# Patient Record
Sex: MALE | Race: BLACK OR AFRICAN AMERICAN | NOT HISPANIC OR LATINO
[De-identification: names, ages, dates, MRNs, and addresses within clinical notes are randomized per-mention and may not be internally consistent; named-entity substitution may affect disease eponyms.]

---

## 2018-08-20 PROBLEM — Z00.00 ENCOUNTER FOR PREVENTIVE HEALTH EXAMINATION: Status: ACTIVE | Noted: 2018-08-20

## 2018-08-22 ENCOUNTER — APPOINTMENT (OUTPATIENT)
Dept: OTOLARYNGOLOGY | Facility: CLINIC | Age: 58
End: 2018-08-22
Payer: COMMERCIAL

## 2018-08-22 VITALS
HEART RATE: 71 BPM | SYSTOLIC BLOOD PRESSURE: 141 MMHG | OXYGEN SATURATION: 99 % | DIASTOLIC BLOOD PRESSURE: 99 MMHG | WEIGHT: 198 LBS | TEMPERATURE: 99.3 F | BODY MASS INDEX: 27.72 KG/M2 | HEIGHT: 71 IN

## 2018-08-22 PROCEDURE — 99203 OFFICE O/P NEW LOW 30 MIN: CPT

## 2018-08-29 ENCOUNTER — APPOINTMENT (OUTPATIENT)
Dept: OTOLARYNGOLOGY | Facility: CLINIC | Age: 58
End: 2018-08-29
Payer: COMMERCIAL

## 2018-08-29 VITALS
DIASTOLIC BLOOD PRESSURE: 95 MMHG | WEIGHT: 198 LBS | OXYGEN SATURATION: 99 % | HEART RATE: 93 BPM | TEMPERATURE: 98.4 F | HEIGHT: 71 IN | SYSTOLIC BLOOD PRESSURE: 137 MMHG | BODY MASS INDEX: 27.72 KG/M2

## 2018-08-29 DIAGNOSIS — Z87.19 PERSONAL HISTORY OF OTHER DISEASES OF THE DIGESTIVE SYSTEM: ICD-10-CM

## 2018-08-29 DIAGNOSIS — R22.0 LOCALIZED SWELLING, MASS AND LUMP, HEAD: ICD-10-CM

## 2018-08-29 DIAGNOSIS — Z78.9 OTHER SPECIFIED HEALTH STATUS: ICD-10-CM

## 2018-08-29 PROCEDURE — 31575 DIAGNOSTIC LARYNGOSCOPY: CPT

## 2018-08-29 PROCEDURE — 99213 OFFICE O/P EST LOW 20 MIN: CPT | Mod: 25

## 2018-08-31 VITALS
TEMPERATURE: 97 F | OXYGEN SATURATION: 98 % | WEIGHT: 187.83 LBS | RESPIRATION RATE: 16 BRPM | SYSTOLIC BLOOD PRESSURE: 149 MMHG | HEART RATE: 96 BPM | HEIGHT: 71 IN | DIASTOLIC BLOOD PRESSURE: 84 MMHG

## 2018-08-31 NOTE — ASU PATIENT PROFILE, ADULT - PMH
Gastritis    Hiatal hernia    Tonsillar mass Diverticulosis    Gastritis    Hiatal hernia    Tonsillar mass

## 2018-08-31 NOTE — ASU PATIENT PROFILE, ADULT - PSH
History of hip surgery  debridement 2008 Elective surgery  anal cyst removed  History of hip surgery  left, debridement 2008

## 2018-09-04 ENCOUNTER — OUTPATIENT (OUTPATIENT)
Dept: OUTPATIENT SERVICES | Facility: HOSPITAL | Age: 58
LOS: 1 days | Discharge: HOME | End: 2018-09-04
Payer: COMMERCIAL

## 2018-09-04 ENCOUNTER — RESULT REVIEW (OUTPATIENT)
Age: 58
End: 2018-09-04

## 2018-09-04 ENCOUNTER — APPOINTMENT (OUTPATIENT)
Dept: OTOLARYNGOLOGY | Facility: HOSPITAL | Age: 58
End: 2018-09-04

## 2018-09-04 VITALS
DIASTOLIC BLOOD PRESSURE: 82 MMHG | RESPIRATION RATE: 16 BRPM | OXYGEN SATURATION: 98 % | HEART RATE: 76 BPM | SYSTOLIC BLOOD PRESSURE: 132 MMHG

## 2018-09-04 DIAGNOSIS — Z98.890 OTHER SPECIFIED POSTPROCEDURAL STATES: Chronic | ICD-10-CM

## 2018-09-04 DIAGNOSIS — Z41.9 ENCOUNTER FOR PROCEDURE FOR PURPOSES OTHER THAN REMEDYING HEALTH STATE, UNSPECIFIED: Chronic | ICD-10-CM

## 2018-09-04 PROCEDURE — 88331 PATH CONSLTJ SURG 1 BLK 1SPC: CPT

## 2018-09-04 PROCEDURE — 88341 IMHCHEM/IMCYTCHM EA ADD ANTB: CPT

## 2018-09-04 PROCEDURE — 31536 LARYNGOSCOPY W/BX & OP SCOPE: CPT

## 2018-09-04 PROCEDURE — 42800 BIOPSY OF THROAT: CPT

## 2018-09-04 PROCEDURE — C9399: CPT

## 2018-09-04 PROCEDURE — 88305 TISSUE EXAM BY PATHOLOGIST: CPT

## 2018-09-04 RX ORDER — OXYCODONE AND ACETAMINOPHEN 5; 325 MG/1; MG/1
2 TABLET ORAL EVERY 6 HOURS
Qty: 0 | Refills: 0 | Status: DISCONTINUED | OUTPATIENT
Start: 2018-09-04 | End: 2018-09-04

## 2018-09-04 RX ORDER — ONDANSETRON 8 MG/1
4 TABLET, FILM COATED ORAL ONCE
Qty: 0 | Refills: 0 | Status: DISCONTINUED | OUTPATIENT
Start: 2018-09-04 | End: 2018-09-04

## 2018-09-04 RX ORDER — OXYCODONE AND ACETAMINOPHEN 5; 325 MG/1; MG/1
1 TABLET ORAL EVERY 4 HOURS
Qty: 0 | Refills: 0 | Status: DISCONTINUED | OUTPATIENT
Start: 2018-09-04 | End: 2018-09-04

## 2018-09-04 NOTE — BRIEF OPERATIVE NOTE - OPERATION/FINDINGS
endophytic right tonsillar mass with extension into the soft palate, biopsy taken down to muscle, frozen section positive for invasive squamous cell carcinoma

## 2018-09-05 PROBLEM — K57.90 DIVERTICULOSIS OF INTESTINE, PART UNSPECIFIED, WITHOUT PERFORATION OR ABSCESS WITHOUT BLEEDING: Chronic | Status: ACTIVE | Noted: 2018-08-31

## 2018-09-05 PROBLEM — K29.70 GASTRITIS, UNSPECIFIED, WITHOUT BLEEDING: Chronic | Status: ACTIVE | Noted: 2018-08-31

## 2018-09-05 PROBLEM — R22.0 LOCALIZED SWELLING, MASS AND LUMP, HEAD: Chronic | Status: ACTIVE | Noted: 2018-08-31

## 2018-09-05 PROBLEM — K44.9 DIAPHRAGMATIC HERNIA WITHOUT OBSTRUCTION OR GANGRENE: Chronic | Status: ACTIVE | Noted: 2018-08-31

## 2018-09-06 LAB — SURGICAL PATHOLOGY STUDY: SIGNIFICANT CHANGE UP

## 2018-09-17 ENCOUNTER — APPOINTMENT (OUTPATIENT)
Dept: OTOLARYNGOLOGY | Facility: CLINIC | Age: 58
End: 2018-09-17
Payer: COMMERCIAL

## 2018-09-17 VITALS
DIASTOLIC BLOOD PRESSURE: 89 MMHG | HEIGHT: 71 IN | BODY MASS INDEX: 27.72 KG/M2 | TEMPERATURE: 98.1 F | WEIGHT: 198 LBS | SYSTOLIC BLOOD PRESSURE: 151 MMHG | HEART RATE: 72 BPM | OXYGEN SATURATION: 99 %

## 2018-09-17 PROCEDURE — 99213 OFFICE O/P EST LOW 20 MIN: CPT

## 2018-09-24 ENCOUNTER — APPOINTMENT (OUTPATIENT)
Dept: RADIATION ONCOLOGY | Facility: CLINIC | Age: 58
End: 2018-09-24
Payer: COMMERCIAL

## 2018-09-24 VITALS
OXYGEN SATURATION: 100 % | RESPIRATION RATE: 16 BRPM | WEIGHT: 191.5 LBS | HEIGHT: 71 IN | HEART RATE: 79 BPM | BODY MASS INDEX: 26.81 KG/M2 | SYSTOLIC BLOOD PRESSURE: 136 MMHG | DIASTOLIC BLOOD PRESSURE: 92 MMHG

## 2018-09-24 DIAGNOSIS — Z87.19 PERSONAL HISTORY OF OTHER DISEASES OF THE DIGESTIVE SYSTEM: ICD-10-CM

## 2018-09-24 DIAGNOSIS — K76.9 LIVER DISEASE, UNSPECIFIED: ICD-10-CM

## 2018-09-24 DIAGNOSIS — Z80.7 FAMILY HISTORY OF OTHER MALIGNANT NEOPLASMS OF LYMPHOID, HEMATOPOIETIC AND RELATED TISSUES: ICD-10-CM

## 2018-09-24 PROCEDURE — 99205 OFFICE O/P NEW HI 60 MIN: CPT | Mod: 25,GC

## 2018-09-24 PROCEDURE — 31575 DIAGNOSTIC LARYNGOSCOPY: CPT

## 2018-09-24 NOTE — PROCEDURE
[Topical Lidocaine] : topical lidocaine [Flexible Endoscope] : examined with the flexible endoscope [Photographs Taken] : photographs taken [Normal] : the true vocal cords ~T were normal [FreeTextEntry3] : lymphoid tissue  [de-identified] : Right tonsil mass bulging into soft palate seen from above

## 2018-09-24 NOTE — HISTORY OF PRESENT ILLNESS
[FreeTextEntry1] : Mr. 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 He was referred to Dr. Laguna (ENT) who recommended a MRI. MRI neck 8/15/18 showed a 3.7 x 2.4 x 2.2 cm enhancing lobular mass at the right palatine tonsil with inferior extension to the glossotonsillar sulcus.\par \par He consulted with  Dr. Galicia (ENT) who referred him to Dr. Carey and was then referred to Dr. Barrios for Right tonsillar lesion which was clinically suspicious for cancer. \par \par He underwent a DL/biopsy by Dr. Barrios 9/4/18 which showed exophytic right tonsil tumor extending into soft palate adjacent to uvula, involving entire right tonsil bed and lateral pharyngeal wall and extending into the Rt GTS.  Right tonsillar biopsy showed invasive squamous cell carcinoma, moderately differentiated and strongly and diffusely positive for p16.\par \par PET/CT 9/12/18 showed a right palatine tonsil mass measuring 3.7 x 2.4 x 2.2 cm which shows abnormal FDG activity with SUV max 11.9. There was 2 tiny nodules in the right middle lobe with FDG activity and a tiny focus of FDG activity in the liver. MRI abd 9/20/18- no liver lesions identified. \par \par His case was presented at tumor board with  consensus is for CRT. 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 He was referred to [facility] and [facility].\par \par Today he notes pain left side of tongue that has improved over past few days. Not sure if it is post anesthesia injection and has slight abdominal discomfort. Denies throat pain or dysphagia. Notes 25 lb wt loss over past 5 lbs due to "lifestyle changes" including not drinking alcohol. Today he has an appt. to see Dr. Cuevas.\par

## 2018-09-24 NOTE — REVIEW OF SYSTEMS
[Patient Intake Form Reviewed] : Patient intake form was reviewed [Recent Change In Weight] : ~T recent weight change [Abdominal Pain] : abdominal pain [Negative] : Heme/Lymph [Fever] : no fever [Chills] : no chills [Night Sweats] : no night sweats [Swollen Glands] : no swollen glands [FreeTextEntry2] : see hpi [FreeTextEntry4] : right intermittent ear pain [FreeTextEntry7] : hx gastritis, diverticulosis, see hpi

## 2018-09-24 NOTE — PHYSICAL EXAM
[Heart Rate And Rhythm] : heart rate and rhythm were normal [Heart Sounds] : normal S1 and S2 [Bowel Sounds] : normal bowel sounds [Abdomen Soft] : soft [Cervical Lymph Nodes Enlarged Posterior Bilaterally] : posterior cervical [Cervical Lymph Nodes Enlarged Anterior Bilaterally] : anterior cervical [Supraclavicular Lymph Nodes Enlarged Bilaterally] : supraclavicular [Oriented To Time, Place, And Person] : oriented to person, place, and time [Normal] : no joint swelling, no clubbing or cyanosis of the fingernails and muscle strength and tone were normal [de-identified] : right tonsillar mass extending to soft palate, appx 0.5cm from uvula. very minimal trismus

## 2018-09-24 NOTE — DISEASE MANAGEMENT
[Pathological] : TNM Stage: p [I] : I [FreeTextEntry4] : p16 positive right tonsil cancer [TTNM] : 2 [NTNM] : 0 [MTNM] : 0

## 2018-09-24 NOTE — VITALS
[Maximal Pain Intensity: 4/10] : 4/10 [Pain Location: ___] : Pain Location: [unfilled] [90: Able to carry normal activity; minor signs or symptoms of disease.] : 90: Able to carry normal activity; minor signs or symptoms of disease.  [4 - Distress Level] : Distress Level: 4 [Least Pain Intensity: 0/10] : 0/10 [ECOG Performance Status: 1 - Restricted in physically strenuous activity but ambulatory and able to carry out work of a light or sedentary nature] : Performance Status: 1 - Restricted in physically strenuous activity but ambulatory and able to carry out work of a light or sedentary nature, e.g., light house work, office work

## 2018-09-24 NOTE — REASON FOR VISIT
[Consideration of Curative Therapy] : consideration of curative therapy for [Head and Neck Cancer] : head and neck cancer

## 2018-09-26 ENCOUNTER — MOBILE ON CALL (OUTPATIENT)
Age: 58
End: 2018-09-26

## 2018-09-26 ENCOUNTER — CLINICAL ADVICE (OUTPATIENT)
Age: 58
End: 2018-09-26

## 2018-10-16 VITALS
SYSTOLIC BLOOD PRESSURE: 135 MMHG | HEART RATE: 74 BPM | WEIGHT: 195.5 LBS | DIASTOLIC BLOOD PRESSURE: 91 MMHG | BODY MASS INDEX: 27.27 KG/M2 | RESPIRATION RATE: 16 BRPM

## 2018-10-16 VITALS — SYSTOLIC BLOOD PRESSURE: 128 MMHG | DIASTOLIC BLOOD PRESSURE: 88 MMHG | HEART RATE: 79 BPM

## 2018-10-16 NOTE — DISEASE MANAGEMENT
[Pathological] : TNM Stage: p [I] : I [FreeTextEntry4] : p16 positive right tonsil cancer [TTNM] : 2 [NTNM] : 0 [MTNM] : 0 [de-identified] : 272 [de-identified] : 6196 [de-identified] : oropharynx/neck

## 2018-10-16 NOTE — PHYSICAL EXAM
[Heart Rate And Rhythm] : heart rate and rhythm were normal [Heart Sounds] : normal S1 and S2 [Bowel Sounds] : normal bowel sounds [Abdomen Soft] : soft [Cervical Lymph Nodes Enlarged Posterior Bilaterally] : posterior cervical [Cervical Lymph Nodes Enlarged Anterior Bilaterally] : anterior cervical [Supraclavicular Lymph Nodes Enlarged Bilaterally] : supraclavicular [Skin Color & Pigmentation] : normal skin color and pigmentation [Oriented To Time, Place, And Person] : oriented to person, place, and time [Normal] : no focal deficits [de-identified] : oral mucosa moist, right tonsillar erythema

## 2018-10-16 NOTE — REASON FOR VISIT
[Head and Neck Cancer] : head and neck cancer [Routine On-Treatment] : a routine on-treatment visit for

## 2018-10-16 NOTE — REVIEW OF SYSTEMS
[Patient Intake Form Reviewed] : Patient intake form was reviewed [Recent Change In Weight] : ~T recent weight change [Abdominal Pain] : abdominal pain [Negative] : Heme/Lymph [Fever] : no fever [Chills] : no chills [Night Sweats] : no night sweats [Swollen Glands] : no swollen glands [FreeTextEntry2] : see hpi [FreeTextEntry4] : right intermittent ear pain [FreeTextEntry7] : hx gastritis, diverticulosis, see hpi [Dysphagia] : no dysphagia [Odynophagia] : no odynophagia [Skin Rash] : no skin rash

## 2018-10-16 NOTE — VITALS
[Least Pain Intensity: 0/10] : 0/10 [90: Able to carry normal activity; minor signs or symptoms of disease.] : 90: Able to carry normal activity; minor signs or symptoms of disease.  [4 - Distress Level] : Distress Level: 4 [Maximal Pain Intensity: 4/10] : 4/10 [Pain Location: ___] : Pain Location: [unfilled] [ECOG Performance Status: 1 - Restricted in physically strenuous activity but ambulatory and able to carry out work of a light or sedentary nature] : Performance Status: 1 - Restricted in physically strenuous activity but ambulatory and able to carry out work of a light or sedentary nature, e.g., light house work, office work

## 2018-10-16 NOTE — PROCEDURE
[Topical Lidocaine] : topical lidocaine [Flexible Endoscope] : examined with the flexible endoscope [Photographs Taken] : photographs taken [Normal] : the true vocal cords ~T were normal [FreeTextEntry3] : lymphoid tissue  [de-identified] : Right tonsil mass bulging into soft palate seen from above

## 2018-10-16 NOTE — HISTORY OF PRESENT ILLNESS
[FreeTextEntry1] : 10/16/18-OTV-Mr. Jimbo Martinez has completed 848/6996cGy to the oropharynx/neck.\par Today he notes a little fatigue relieved with rest. Denies throat pain of dysphagia. Appetite is good. No new issues. Mild headache and neck "burning" since beginning RT. Has not started skin care. Will enroll on [employer].\par \par Oncologic History\par Mr. 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 He was referred to Dr. Laguna (ENT) who recommended a MRI. MRI neck 8/15/18 showed a 3.7 x 2.4 x 2.2 cm enhancing lobular mass at the right palatine tonsil with inferior extension to the glossotonsillar sulcus.\par \par He consulted with  Dr. Galicia (ENT) who referred him to Dr. Carey and was then referred to Dr. Barrios for Right tonsillar lesion which was clinically suspicious for cancer. \par \par He underwent a DL/biopsy by Dr. Barrios 9/4/18 which showed exophytic right tonsil tumor extending into soft palate adjacent to uvula, involving entire right tonsil bed and lateral pharyngeal wall and extending into the Rt GTS.  Right tonsillar biopsy showed invasive squamous cell carcinoma, moderately differentiated and strongly and diffusely positive for p16.\par \par PET/CT 9/12/18 showed a right palatine tonsil mass measuring 3.7 x 2.4 x 2.2 cm which shows abnormal FDG activity with SUV max 11.9. There was 2 tiny nodules in the right middle lobe with FDG activity and a tiny focus of FDG activity in the liver. MRI abd 9/20/18- no liver lesions identified. \par \par His case was presented at tumor board with  consensus is for CRT. 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 He was referred to [facility] and [facility].\par \par Today he notes pain left side of tongue that has improved over past few days. Not sure if it is post anesthesia injection and has slight abdominal discomfort. Denies throat pain or dysphagia. Notes 25 lb wt loss over past 5 lbs due to "lifestyle changes" including not drinking alcohol. Today he has an appt. to see Dr. Cuevas.\par

## 2018-10-23 VITALS
RESPIRATION RATE: 16 BRPM | SYSTOLIC BLOOD PRESSURE: 137 MMHG | WEIGHT: 196.2 LBS | DIASTOLIC BLOOD PRESSURE: 91 MMHG | HEART RATE: 84 BPM | BODY MASS INDEX: 27.36 KG/M2

## 2018-10-23 VITALS — SYSTOLIC BLOOD PRESSURE: 128 MMHG | DIASTOLIC BLOOD PRESSURE: 96 MMHG | HEART RATE: 78 BPM

## 2018-10-23 NOTE — PHYSICAL EXAM
[Skin Color & Pigmentation] : normal skin color and pigmentation [Oriented To Time, Place, And Person] : oriented to person, place, and time [Normal] : supple with no thyromegaly or masses appreciated [de-identified] : dry tongue

## 2018-10-23 NOTE — VITALS
[Least Pain Intensity: 0/10] : 0/10 [90: Able to carry normal activity; minor signs or symptoms of disease.] : 90: Able to carry normal activity; minor signs or symptoms of disease.  [Maximal Pain Intensity: 2/10] : 2/10 [Pain Location: ___] : Pain Location: [unfilled] [ECOG Performance Status: 1 - Restricted in physically strenuous activity but ambulatory and able to carry out work of a light or sedentary nature] : Performance Status: 1 - Restricted in physically strenuous activity but ambulatory and able to carry out work of a light or sedentary nature, e.g., light house work, office work

## 2018-10-23 NOTE — REASON FOR VISIT
[Routine On-Treatment] : a routine on-treatment visit for [Head and Neck Cancer] : head and neck cancer

## 2018-10-23 NOTE — HISTORY OF PRESENT ILLNESS
[FreeTextEntry1] : 10/23/18- OTV- Mr. Jimbo Martinez has completed 1908cGy/6996 cGy to the oropharynx/neck.\par Today he notes fatigue, dry mouth and right throat pain 2/10. Taking gabapentin 300mg-600mg. Appetite is good. Denies dysphagia or dysphagia. Neck burns post RT but then resolves. Using aquaphor and biotene. Not receiving conversa chats yet, wrong #.\par \par 10/16/18-OTV- Mr. Jimbo Martinez has completed 1908/ 6996cGy to the oropharynx/neck.\par Today he notes a little fatigue relieved with rest. Denies throat pain of dysphagia. Appetite is good. No new issues. Mild headache and neck "burning" since beginning RT. Has not started skin care. Will enroll on [employer].\par \par Oncologic History\par Mr. 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 He was referred to Dr. Laguna (ENT) who recommended a MRI. MRI neck 8/15/18 showed a 3.7 x 2.4 x 2.2 cm enhancing lobular mass at the right palatine tonsil with inferior extension to the glossotonsillar sulcus.\par \par He consulted with  Dr. Galicia (ENT) who referred him to Dr. Carey and was then referred to Dr. Barrios for Right tonsillar lesion which was clinically suspicious for cancer. \par \par He underwent a DL/biopsy by Dr. Barrios 9/4/18 which showed exophytic right tonsil tumor extending into soft palate adjacent to uvula, involving entire right tonsil bed and lateral pharyngeal wall and extending into the Rt GTS.  Right tonsillar biopsy showed invasive squamous cell carcinoma, moderately differentiated and strongly and diffusely positive for p16.\par \par PET/CT 9/12/18 showed a right palatine tonsil mass measuring 3.7 x 2.4 x 2.2 cm which shows abnormal FDG activity with SUV max 11.9. There was 2 tiny nodules in the right middle lobe with FDG activity and a tiny focus of FDG activity in the liver. MRI abd 9/20/18- no liver lesions identified. \par \par His case was presented at tumor board with  consensus is for CRT. 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 He was referred to [facility] and [facility].\par \par Today he notes pain left side of tongue that has improved over past few days. Not sure if it is post anesthesia injection and has slight abdominal discomfort. Denies throat pain or dysphagia. Notes 25 lb wt loss over past 5 lbs due to "lifestyle changes" including not drinking alcohol. Today he has an appt. to see Dr. Cuevas.\par

## 2018-10-23 NOTE — REVIEW OF SYSTEMS
[Fatigue: Grade 1 - Fatigue relieved by rest] : Fatigue: Grade 1 - Fatigue relieved by rest [Xerostomia: Grade 1 - Symptomatic (e.g., dry or thick saliva) without significant dietary alteration; unstimulated saliva flow >0.2 ml/min] : Xerostomia: Grade 1 - Symptomatic (e.g., dry or thick saliva) without significant dietary alteration; unstimulated saliva flow >0.2 ml/min [Oral Pain: Grade 1 - Mild pain] : Oral Pain: Grade 1 - Mild pain [Dysgeusia: Grade 0] : Dysgeusia: Grade 0 [Mucositis Oral: Grade 1 - Asymptomatic or mild symptoms; intervention not indicated] : Mucositis Oral: Grade 1 - Asymptomatic or mild symptoms; intervention not indicated [Dysphagia] : no dysphagia [Odynophagia] : no odynophagia [Skin Rash] : no skin rash [FreeTextEntry4] : dry mouth

## 2018-10-23 NOTE — DISEASE MANAGEMENT
[Pathological] : TNM Stage: p [I] : I [FreeTextEntry4] : p16 positive right tonsil cancer [TTNM] : 2 [NTNM] : 0 [MTNM] : 0 [de-identified] : 1908  [de-identified] : 6996 cGy [de-identified] : oropharynx/ neck

## 2018-10-30 VITALS — DIASTOLIC BLOOD PRESSURE: 92 MMHG | SYSTOLIC BLOOD PRESSURE: 120 MMHG

## 2018-10-30 VITALS
BODY MASS INDEX: 27.09 KG/M2 | SYSTOLIC BLOOD PRESSURE: 133 MMHG | HEART RATE: 76 BPM | OXYGEN SATURATION: 100 % | RESPIRATION RATE: 16 BRPM | WEIGHT: 194.2 LBS | DIASTOLIC BLOOD PRESSURE: 99 MMHG

## 2018-10-30 NOTE — REVIEW OF SYSTEMS
[Fatigue: Grade 1 - Fatigue relieved by rest] : Fatigue: Grade 1 - Fatigue relieved by rest [Constipation: Grade 1 - Occasional or intermittent symptoms; occasional use of stool softeners, laxatives, dietary modification, or enema] : Constipation: Grade 1 - Occasional or intermittent symptoms; occasional use of stool softeners, laxatives, dietary modification, or enema [Dysphagia: Grade 1 - Symptomatic, able to eat regular diet] : Dysphagia: Grade 1 - Symptomatic, able to eat regular diet [Mucositis Oral: Grade 2 - Moderate pain; not interfering with oral intake; modified diet indicated] : Mucositis Oral: Grade 2 - Moderate pain; not interfering with oral intake; modified diet indicated [Xerostomia: Grade 2 - Moderate symptoms; oral intake alterations (e.g., copious water, other lubricants, diet limited to purees and/or soft, moist foods); unstimulated saliva 0.1 to 0.2 ml/min] : Xerostomia: Grade 2 - Moderate symptoms; oral intake alterations (e.g., copious water, other lubricants, diet limited to purees and/or soft, moist foods); unstimulated saliva 0.1 to 0.2 ml/min [Oral Pain: Grade 2 - Moderate pain; limiting instrumental ADL] : Oral Pain: Grade 2 - Moderate pain; limiting instrumental ADL [Dysgeusia: Grade 2 - Altered taste with change in diet (e.g., oral supplements); noxious or unpleasant taste; loss of taste] : Dysgeusia: Grade 2 - Altered taste with change in diet (e.g., oral supplements); noxious or unpleasant taste; loss of taste [Skin Rash] : no skin rash [FreeTextEntry4] : as in HPI

## 2018-10-30 NOTE — VITALS
[Maximal Pain Intensity: 2/10] : 2/10 [Least Pain Intensity: 0/10] : 0/10 [90: Able to carry normal activity; minor signs or symptoms of disease.] : 90: Able to carry normal activity; minor signs or symptoms of disease.  [ECOG Performance Status: 1 - Restricted in physically strenuous activity but ambulatory and able to carry out work of a light or sedentary nature] : Performance Status: 1 - Restricted in physically strenuous activity but ambulatory and able to carry out work of a light or sedentary nature, e.g., light house work, office work [Pain Location: ___] : Pain Location: [unfilled]

## 2018-10-30 NOTE — PHYSICAL EXAM
[Skin Color & Pigmentation] : normal skin color and pigmentation [Oriented To Time, Place, And Person] : oriented to person, place, and time [Normal] : no focal deficits [de-identified] : grade 1-2  mucositis right tonsil, RMT, posterior buccal

## 2018-10-30 NOTE — HISTORY OF PRESENT ILLNESS
[FreeTextEntry1] : 10/30/18 OTV- Mr. Jimbo Martinez has completed 2968 cGy/6996 cGy to the oropharynx/neck.\par Today he notes fatigue, dry mouth, and taste has changed. Notes that everything tastes salty. He complains of right throat pain 2/10 He increased gabapentin to 900-600-600 mg this AM.  Not using tylenol in addition to gabapentin as advised. Using bicarbonate, just started MMW. Using prevident toothpaste. Using aquaphor, doing H&N exercises and participating in conversa chats.\par \par 10/23/18- OTV- Mr. Jimbo Martinez has completed 1908cGy/6996 cGy to the oropharynx/neck.\par Today he notes fatigue, dry mouth and right throat pain 2/10. Taking gabapentin 300mg-600mg. Appetite is good. Denies dysphagia or dysphagia. Neck burns post RT but then resolves. Using aquaphor and biotene. Not receiving conversa chats yet, wrong #.\par \par 10/16/18-OTV- Mr. Jimbo Martinez has completed 1908/ 6996cGy to the oropharynx/neck.\par Today he notes a little fatigue relieved with rest. Denies throat pain of dysphagia. Appetite is good. No new issues. Mild headache and neck "burning" since beginning RT. Has not started skin care. Will enroll on [employer].\par \par Oncologic History\par Mr. 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 He was referred to Dr. Laguna (ENT) who recommended a MRI. MRI neck 8/15/18 showed a 3.7 x 2.4 x 2.2 cm enhancing lobular mass at the right palatine tonsil with inferior extension to the glossotonsillar sulcus.\par \par He consulted with  Dr. Galicia (ENT) who referred him to Dr. Carey and was then referred to Dr. Barrios for Right tonsillar lesion which was clinically suspicious for cancer. \par \par He underwent a DL/biopsy by Dr. Barrios 9/4/18 which showed exophytic right tonsil tumor extending into soft palate adjacent to uvula, involving entire right tonsil bed and lateral pharyngeal wall and extending into the Rt GTS.  Right tonsillar biopsy showed invasive squamous cell carcinoma, moderately differentiated and strongly and diffusely positive for p16.\par \par PET/CT 9/12/18 showed a right palatine tonsil mass measuring 3.7 x 2.4 x 2.2 cm which shows abnormal FDG activity with SUV max 11.9. There was 2 tiny nodules in the right middle lobe with FDG activity and a tiny focus of FDG activity in the liver. MRI abd 9/20/18- no liver lesions identified. \par \par His case was presented at tumor board with  consensus is for CRT. 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 He was referred to [facility] and [facility].\par \par Today he notes pain left side of tongue that has improved over past few days. Not sure if it is post anesthesia injection and has slight abdominal discomfort. Denies throat pain or dysphagia. Notes 25 lb wt loss over past 5 lbs due to "lifestyle changes" including not drinking alcohol. Today he has an appt. to see Dr. Cuevas.\par

## 2018-10-30 NOTE — REASON FOR VISIT
[Routine On-Treatment] : a routine on-treatment visit for [Head and Neck Cancer] : head and neck cancer [Spouse] : spouse

## 2018-10-30 NOTE — DISEASE MANAGEMENT
[Pathological] : TNM Stage: p [I] : I [FreeTextEntry4] : p16 positive right tonsil cancer [TTNM] : 2 [NTNM] : 0 [MTNM] : 0 [de-identified] : 5013 [de-identified] : 6996 cGy [de-identified] : oropharynx/ neck

## 2018-11-06 VITALS
HEART RATE: 92 BPM | WEIGHT: 193.3 LBS | BODY MASS INDEX: 26.96 KG/M2 | OXYGEN SATURATION: 98 % | SYSTOLIC BLOOD PRESSURE: 125 MMHG | RESPIRATION RATE: 16 BRPM | DIASTOLIC BLOOD PRESSURE: 85 MMHG

## 2018-11-06 VITALS — SYSTOLIC BLOOD PRESSURE: 128 MMHG | HEART RATE: 88 BPM | DIASTOLIC BLOOD PRESSURE: 78 MMHG

## 2018-11-06 NOTE — HISTORY OF PRESENT ILLNESS
[FreeTextEntry1] : 11/6/18- OTV- Mr. Jimbo Martinez has completed 4028 cGy/6996 cGy to the oropharynx/neck.\par Today he states that he feels fatigued. Notes pain right side of mouth 3/10. Taking gabapentin 900mg TID but ran out of Gabapentin over weekend and used tylenol for pain. Denies dysphagia. Food tastes salty.  Uses bicarbonate 4x day and MMW rinse before meals. Using prevident toothpaste. Using aquaphor, doing H&N exercises and participating in conversa chats.\par \par 10/30/18 OTV- Mr. Jimbo Martinez has completed 2968 cGy/6996 cGy to the oropharynx/neck.\par Today he notes fatigue, dry mouth, and taste has changed. Notes that everything tastes salty. He complains of right throat pain 2/10 He increased gabapentin to 900-600-600 mg this AM.  Not using tylenol in addition to gabapentin as advised. Using bicarbonate, just started MMW. Using prevident toothpaste. Using aquaphor, doing H&N exercises and participating in conversa chats.\par \par 10/23/18- OTV- Mr. Jimbo Martinez has completed 1908cGy/6996 cGy to the oropharynx/neck.\par Today he notes fatigue, dry mouth and right throat pain 2/10. Taking gabapentin 300mg-600mg. Appetite is good. Denies dysphagia or dysphagia. Neck burns post RT but then resolves. Using aquaphor and biotene. Not receiving conversa chats yet, wrong #.\par \par 10/16/18-OTV- Mr. Jimbo Martinez has completed 1908/ 6996cGy to the oropharynx/neck.\par Today he notes a little fatigue relieved with rest. Denies throat pain of dysphagia. Appetite is good. No new issues. Mild headache and neck "burning" since beginning RT. Has not started skin care. Will enroll on [employer].\par \par Oncologic History\par Mr. 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 He was referred to Dr. Laguna (ENT) who recommended a MRI. MRI neck 8/15/18 showed a 3.7 x 2.4 x 2.2 cm enhancing lobular mass at the right palatine tonsil with inferior extension to the glossotonsillar sulcus.\par \par He consulted with  Dr. Galicia (ENT) who referred him to Dr. Carey and was then referred to Dr. Barrios for Right tonsillar lesion which was clinically suspicious for cancer. \par \par He underwent a DL/biopsy by Dr. Barrios 9/4/18 which showed exophytic right tonsil tumor extending into soft palate adjacent to uvula, involving entire right tonsil bed and lateral pharyngeal wall and extending into the Rt GTS.  Right tonsillar biopsy showed invasive squamous cell carcinoma, moderately differentiated and strongly and diffusely positive for p16.\par \par PET/CT 9/12/18 showed a right palatine tonsil mass measuring 3.7 x 2.4 x 2.2 cm which shows abnormal FDG activity with SUV max 11.9. There was 2 tiny nodules in the right middle lobe with FDG activity and a tiny focus of FDG activity in the liver. MRI abd 9/20/18- no liver lesions identified. \par \par His case was presented at tumor board with  consensus is for CRT. 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 He was referred to [facility].\par \par Today he notes pain left side of tongue that has improved over past few days. Not sure if it is post anesthesia injection and has slight abdominal discomfort. Denies throat pain or dysphagia. Notes 25 lb wt loss over past 5 lbs due to "lifestyle changes" including not drinking alcohol. Today he has an appt. to see Dr. Cuevas.\par

## 2018-11-06 NOTE — REVIEW OF SYSTEMS
[Fatigue: Grade 1 - Fatigue relieved by rest] : Fatigue: Grade 1 - Fatigue relieved by rest [Mucositis Oral: Grade 2 - Moderate pain; not interfering with oral intake; modified diet indicated] : Mucositis Oral: Grade 2 - Moderate pain; not interfering with oral intake; modified diet indicated [Xerostomia: Grade 2 - Moderate symptoms; oral intake alterations (e.g., copious water, other lubricants, diet limited to purees and/or soft, moist foods); unstimulated saliva 0.1 to 0.2 ml/min] : Xerostomia: Grade 2 - Moderate symptoms; oral intake alterations (e.g., copious water, other lubricants, diet limited to purees and/or soft, moist foods); unstimulated saliva 0.1 to 0.2 ml/min [Oral Pain: Grade 2 - Moderate pain; limiting instrumental ADL] : Oral Pain: Grade 2 - Moderate pain; limiting instrumental ADL [Dysgeusia: Grade 2 - Altered taste with change in diet (e.g., oral supplements); noxious or unpleasant taste; loss of taste] : Dysgeusia: Grade 2 - Altered taste with change in diet (e.g., oral supplements); noxious or unpleasant taste; loss of taste [Dysphagia: Grade 0] : Dysphagia: Grade 0 [Fatigue] : fatigue [Skin Rash] : no skin rash [FreeTextEntry4] : as in HPI

## 2018-11-06 NOTE — PHYSICAL EXAM
[Skin Color & Pigmentation] : normal skin color and pigmentation [Oriented To Time, Place, And Person] : oriented to person, place, and time [] : no rash [Normal] : no joint swelling, no clubbing or cyanosis of the fingernails and muscle strength and tone were normal [de-identified] : grade 2  mucositis right tonsil, RMT, posterior buccal

## 2018-11-06 NOTE — VITALS
[Least Pain Intensity: 0/10] : 0/10 [90: Able to carry normal activity; minor signs or symptoms of disease.] : 90: Able to carry normal activity; minor signs or symptoms of disease.  [ECOG Performance Status: 1 - Restricted in physically strenuous activity but ambulatory and able to carry out work of a light or sedentary nature] : Performance Status: 1 - Restricted in physically strenuous activity but ambulatory and able to carry out work of a light or sedentary nature, e.g., light house work, office work [Maximal Pain Intensity: 3/10] : 3/10 [Pain Location: ___] : Pain Location: [unfilled] [Adjuvant (neuroleptics)] : adjuvant (neuroleptics)

## 2018-11-06 NOTE — DISEASE MANAGEMENT
[Pathological] : TNM Stage: p [I] : I [FreeTextEntry4] : p16 positive right tonsil cancer [TTNM] : 2 [NTNM] : 0 [MTNM] : 0 [de-identified] : 4215 [de-identified] : 6996 cGy [de-identified] : oropharynx/ neck

## 2018-11-13 ENCOUNTER — TRANSCRIPTION ENCOUNTER (OUTPATIENT)
Age: 58
End: 2018-11-13

## 2018-11-13 VITALS
SYSTOLIC BLOOD PRESSURE: 117 MMHG | DIASTOLIC BLOOD PRESSURE: 81 MMHG | HEART RATE: 85 BPM | BODY MASS INDEX: 26.36 KG/M2 | OXYGEN SATURATION: 97 % | WEIGHT: 189 LBS | RESPIRATION RATE: 16 BRPM

## 2018-11-13 VITALS — HEART RATE: 86 BPM | DIASTOLIC BLOOD PRESSURE: 74 MMHG | SYSTOLIC BLOOD PRESSURE: 108 MMHG

## 2018-11-13 NOTE — VITALS
[Least Pain Intensity: 0/10] : 0/10 [Adjuvant (neuroleptics)] : adjuvant (neuroleptics) [90: Able to carry normal activity; minor signs or symptoms of disease.] : 90: Able to carry normal activity; minor signs or symptoms of disease.  [ECOG Performance Status: 1 - Restricted in physically strenuous activity but ambulatory and able to carry out work of a light or sedentary nature] : Performance Status: 1 - Restricted in physically strenuous activity but ambulatory and able to carry out work of a light or sedentary nature, e.g., light house work, office work [Maximal Pain Intensity: 5/10] : 5/10 [Pain Location: ___] : Pain Location: [unfilled]

## 2018-11-13 NOTE — DISEASE MANAGEMENT
[Pathological] : TNM Stage: p [I] : I [FreeTextEntry4] : p16 positive right tonsil cancer [TTNM] : 2 [NTNM] : 0 [MTNM] : 0 [de-identified] : 5088 cGy [de-identified] : 6996 cGy [de-identified] : oropharynx/ neck

## 2018-11-13 NOTE — REVIEW OF SYSTEMS
[Dysphagia: Grade 0] : Dysphagia: Grade 0 [Mucositis Oral: Grade 2 - Moderate pain; not interfering with oral intake; modified diet indicated] : Mucositis Oral: Grade 2 - Moderate pain; not interfering with oral intake; modified diet indicated [Xerostomia: Grade 2 - Moderate symptoms; oral intake alterations (e.g., copious water, other lubricants, diet limited to purees and/or soft, moist foods); unstimulated saliva 0.1 to 0.2 ml/min] : Xerostomia: Grade 2 - Moderate symptoms; oral intake alterations (e.g., copious water, other lubricants, diet limited to purees and/or soft, moist foods); unstimulated saliva 0.1 to 0.2 ml/min [Oral Pain: Grade 2 - Moderate pain; limiting instrumental ADL] : Oral Pain: Grade 2 - Moderate pain; limiting instrumental ADL [Dysgeusia: Grade 2 - Altered taste with change in diet (e.g., oral supplements); noxious or unpleasant taste; loss of taste] : Dysgeusia: Grade 2 - Altered taste with change in diet (e.g., oral supplements); noxious or unpleasant taste; loss of taste [Esophagitis: Grade 1 - Asymptomatic; clinical or diagnostic observations only; intervention not indicated] : Esophagitis: Grade 1 - Asymptomatic; clinical or diagnostic observations only; intervention not indicated [Fatigue: Grade 1 - Fatigue relieved by rest] : Fatigue: Grade 1 - Fatigue relieved by rest [Odynophagia] : odynophagia [Salivary duct inflammation: Grade 2 - Thick, ropy, sticky saliva; markedly altered taste; alteration in diet indicated; secretion-induced symptoms; limiting instrumental ADL] : Salivary duct inflammation: Grade 2 - Thick, ropy, sticky saliva; markedly altered taste; alteration in diet indicated; secretion-induced symptoms; limiting instrumental ADL [Dysphagia] : no dysphagia [FreeTextEntry2] : as noted in HPI [FreeTextEntry4] : as in HPI [de-identified] : hyperpigmentation neck

## 2018-11-13 NOTE — PHYSICAL EXAM
[Skin Color & Pigmentation] : normal skin color and pigmentation [] : no rash [General Appearance - Alert] : alert [General Appearance - In No Acute Distress] : in no acute distress [Oriented To Time, Place, And Person] : oriented to person, place, and time [Normal] : no focal deficits [de-identified] : grade 2  mucositis  upper palate, posterior buccal  [de-identified] : hyperpigmentation anterior and posteriorneck

## 2018-11-13 NOTE — HISTORY OF PRESENT ILLNESS
[FreeTextEntry1] : 11/13/18- OTV- Mr. Jimbo Martinez has completed 5088 cGy/6996 cGy to the oropharynx/neck.\par Today he states that he feels more fatigued. Notes pain right side of mouth and upper palate 5/10. Taking gabapentin 900mg TID.\par Using bicarbonate 3-4x day and MMW rinse before meals. Using prevident toothpaste, aquaphor and performing H&N exercises.  Participating in conversa chats which he finds helpful.\par \par 11/6/18- OTV- Mr. Jimbo Martinez has completed 4028 cGy/6996 cGy to the oropharynx/neck.\par Today he states that he feels fatigued. Notes pain right side of mouth 3/10. Taking gabapentin 900mg TID but ran out of Gabapentin over weekend and used tylenol for pain. Denies dysphagia. Food tastes salty.  Uses bicarbonate 4x day and MMW rinse before meals. Using prevident toothpaste. Using aquaphor, doing H&N exercises and participating in conversa chats.\par \par 10/30/18 OTV- Mr. Jimbo Martinez has completed 2968 cGy/6996 cGy to the oropharynx/neck.\par Today he notes fatigue, dry mouth, and taste has changed. Notes that everything tastes salty. He complains of right throat pain 2/10 He increased gabapentin to 900-600-600 mg this AM.  Not using tylenol in addition to gabapentin as advised. Using bicarbonate, just started MMW. Using prevident toothpaste. Using aquaphor, doing H&N exercises and participating in conversa chats.\par \par 10/23/18- OTV- Mr. Jimbo Martinez has completed 1908cGy/6996 cGy to the oropharynx/neck.\par Today he notes fatigue, dry mouth and right throat pain 2/10. Taking gabapentin 300mg-600mg. Appetite is good. Denies dysphagia or dysphagia. Neck burns post RT but then resolves. Using aquaphor and biotene. Not receiving conversa chats yet, wrong #.\par \par 10/16/18-OTV- Mr. Jimbo Martinez has completed 1908/ 6996cGy to the oropharynx/neck.\par Today he notes a little fatigue relieved with rest. Denies throat pain of dysphagia. Appetite is good. No new issues. Mild headache and neck "burning" since beginning RT. Has not started skin care. Will enroll on [employer].\par \par Oncologic History\par Mr. 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 He was referred to Dr. Laguna (ENT) who recommended a MRI. MRI neck 8/15/18 showed a 3.7 x 2.4 x 2.2 cm enhancing lobular mass at the right palatine tonsil with inferior extension to the glossotonsillar sulcus.\par \par He consulted with  Dr. Galicia (ENT) who referred him to Dr. Carey and was then referred to Dr. Barrios for Right tonsillar lesion which was clinically suspicious for cancer. \par \par He underwent a DL/biopsy by Dr. Barrios 9/4/18 which showed exophytic right tonsil tumor extending into soft palate adjacent to uvula, involving entire right tonsil bed and lateral pharyngeal wall and extending into the Rt GTS.  Right tonsillar biopsy showed invasive squamous cell carcinoma, moderately differentiated and strongly and diffusely positive for p16.\par \par PET/CT 9/12/18 showed a right palatine tonsil mass measuring 3.7 x 2.4 x 2.2 cm which shows abnormal FDG activity with SUV max 11.9. There was 2 tiny nodules in the right middle lobe with FDG activity and a tiny focus of FDG activity in the liver. MRI abd 9/20/18- no liver lesions identified. \par \par His case was presented at tumor board with  consensus is for CRT. 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 He was referred to [facility] and [facility].\par \par Today he notes pain left side of tongue that has improved over past few days. Not sure if it is post anesthesia injection and has slight abdominal discomfort. Denies throat pain or dysphagia. Notes 25 lb wt loss over past 5 lbs due to "lifestyle changes" including not drinking alcohol. Today he has an appt. to see Dr. Cuevas.\par

## 2018-11-20 VITALS — SYSTOLIC BLOOD PRESSURE: 110 MMHG | HEART RATE: 90 BPM | DIASTOLIC BLOOD PRESSURE: 87 MMHG

## 2018-11-20 VITALS
BODY MASS INDEX: 26.3 KG/M2 | SYSTOLIC BLOOD PRESSURE: 121 MMHG | RESPIRATION RATE: 16 BRPM | HEART RATE: 85 BPM | OXYGEN SATURATION: 100 % | DIASTOLIC BLOOD PRESSURE: 92 MMHG | WEIGHT: 188.6 LBS

## 2018-11-20 NOTE — PHYSICAL EXAM
[General Appearance - Alert] : alert [General Appearance - In No Acute Distress] : in no acute distress [Oriented To Time, Place, And Person] : oriented to person, place, and time [Normal] : no focal deficits [de-identified] : grade 2 mucositis upper palate, posterior buccal  [de-identified] : hyperpigmentation anterior and posterior neck, small open area anterior neck

## 2018-11-20 NOTE — HISTORY OF PRESENT ILLNESS
[FreeTextEntry1] : 11/20/18-OTV- Mr. Jimbo Martinez has completed  5936cGy/6996 cGy to the oropharynx/neck.\par Today he states that he continues to feels fatigued. Notes pain right side of mouth and upper palate 4/10. Notes that swallowing has become a little more difficult but he is able to eat most foods. Foods taste salty or bland.  Taking gabapentin 900mg TID. Using bicarbonate 3-4x day and MMW rinse before meals.  Notes intermittent dizziness which is helped with rest. Using prevident toothpaste, aquaphor and performing H&N exercises.  Participating in conversa chats which he continues to find helpful.\par \par 11/13/18- OTV- Mr. Jimbo Martinez has completed 5088 cGy/6996 cGy to the oropharynx/neck.\par Today he states that he feels more fatigued. Notes pain right side of mouth and upper palate 5/10. Taking gabapentin 900mg TID.\par Using bicarbonate 3-4x day and MMW rinse before meals. Using prevident toothpaste, aquaphor and performing H&N exercises.  Participating in conversa chats which he finds helpful.\par \par 11/6/18- OTV- Mr. Jimbo Martinez has completed 4028 cGy/6996 cGy to the oropharynx/neck.\par Today he states that he feels fatigued. Notes pain right side of mouth 3/10. Taking gabapentin 900mg TID but ran out of Gabapentin over weekend and used tylenol for pain. Denies dysphagia. Food tastes salty.  Uses bicarbonate 4x day and MMW rinse before meals. Using prevident toothpaste. Using aquaphor, doing H&N exercises and participating in conversa chats.\par \par 10/30/18 OTV- Mr. Jimbo Martinez has completed 2968 cGy/6996 cGy to the oropharynx/neck.\par Today he notes fatigue, dry mouth, and taste has changed. Notes that everything tastes salty. He complains of right throat pain 2/10 He increased gabapentin to 900-600-600 mg this AM.  Not using tylenol in addition to gabapentin as advised. Using bicarbonate, just started MMW. Using prevident toothpaste. Using aquaphor, doing H&N exercises and participating in conversa chats.\par \par 10/23/18- OTV- Mr. Jimbo Martinez has completed 1908cGy/6996 cGy to the oropharynx/neck.\par Today he notes fatigue, dry mouth and right throat pain 2/10. Taking gabapentin 300mg-600mg. Appetite is good. Denies dysphagia or dysphagia. Neck burns post RT but then resolves. Using aquaphor and biotene. Not receiving conversa chats yet, wrong #.\par \par 10/16/18-OTV- Mr. Jimbo Martinez has completed 1908/ 6996cGy to the oropharynx/neck.\par Today he notes a little fatigue relieved with rest. Denies throat pain of dysphagia. Appetite is good. No new issues. Mild headache and neck "burning" since beginning RT. Has not started skin care. Will enroll on [employer].\par \par Oncologic History\par Mr. 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 He was referred to Dr. Laguna (ENT) who recommended a MRI. MRI neck 8/15/18 showed a 3.7 x 2.4 x 2.2 cm enhancing lobular mass at the right palatine tonsil with inferior extension to the glossotonsillar sulcus.\par \par He consulted with  Dr. Galicia (ENT) who referred him to Dr. Carey and was then referred to Dr. Barrios for Right tonsillar lesion which was clinically suspicious for cancer. \par \par He underwent a DL/biopsy by Dr. Barrios 9/4/18 which showed exophytic right tonsil tumor extending into soft palate adjacent to uvula, involving entire right tonsil bed and lateral pharyngeal wall and extending into the Rt GTS.  Right tonsillar biopsy showed invasive squamous cell carcinoma, moderately differentiated and strongly and diffusely positive for p16.\par \par PET/CT 9/12/18 showed a right palatine tonsil mass measuring 3.7 x 2.4 x 2.2 cm which shows abnormal FDG activity with SUV max 11.9. There was 2 tiny nodules in the right middle lobe with FDG activity and a tiny focus of FDG activity in the liver. MRI abd 9/20/18- no liver lesions identified. \par \par His case was presented at tumor board with  consensus is for CRT. 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 He was referred to [facility] and [facility].\par \par Today he notes pain left side of tongue that has improved over past few days. Not sure if it is post anesthesia injection and has slight abdominal discomfort. Denies throat pain or dysphagia. Notes 25 lb wt loss over past 5 lbs due to "lifestyle changes" including not drinking alcohol. Today he has an appt. to see Dr. Cuevas.\par

## 2018-11-20 NOTE — VITALS
[Least Pain Intensity: 0/10] : 0/10 [Pain Location: ___] : Pain Location: [unfilled] [Adjuvant (neuroleptics)] : adjuvant (neuroleptics) [Maximal Pain Intensity: 4/10] : 4/10 [80: Normal activity with effort; some signs or symptoms of disease.] : 80: Normal activity with effort; some signs or symptoms of disease.  [ECOG Performance Status: 1 - Restricted in physically strenuous activity but ambulatory and able to carry out work of a light or sedentary nature] : Performance Status: 1 - Restricted in physically strenuous activity but ambulatory and able to carry out work of a light or sedentary nature, e.g., light house work, office work

## 2018-11-20 NOTE — REVIEW OF SYSTEMS
[Esophagitis: Grade 1 - Asymptomatic; clinical or diagnostic observations only; intervention not indicated] : Esophagitis: Grade 1 - Asymptomatic; clinical or diagnostic observations only; intervention not indicated [Fatigue: Grade 1 - Fatigue relieved by rest] : Fatigue: Grade 1 - Fatigue relieved by rest [Mucositis Oral: Grade 2 - Moderate pain; not interfering with oral intake; modified diet indicated] : Mucositis Oral: Grade 2 - Moderate pain; not interfering with oral intake; modified diet indicated [Xerostomia: Grade 2 - Moderate symptoms; oral intake alterations (e.g., copious water, other lubricants, diet limited to purees and/or soft, moist foods); unstimulated saliva 0.1 to 0.2 ml/min] : Xerostomia: Grade 2 - Moderate symptoms; oral intake alterations (e.g., copious water, other lubricants, diet limited to purees and/or soft, moist foods); unstimulated saliva 0.1 to 0.2 ml/min [Oral Pain: Grade 2 - Moderate pain; limiting instrumental ADL] : Oral Pain: Grade 2 - Moderate pain; limiting instrumental ADL [Salivary duct inflammation: Grade 2 - Thick, ropy, sticky saliva; markedly altered taste; alteration in diet indicated; secretion-induced symptoms; limiting instrumental ADL] : Salivary duct inflammation: Grade 2 - Thick, ropy, sticky saliva; markedly altered taste; alteration in diet indicated; secretion-induced symptoms; limiting instrumental ADL [Dysgeusia: Grade 2 - Altered taste with change in diet (e.g., oral supplements); noxious or unpleasant taste; loss of taste] : Dysgeusia: Grade 2 - Altered taste with change in diet (e.g., oral supplements); noxious or unpleasant taste; loss of taste [Odynophagia] : odynophagia [Dysphagia: Grade 1 - Symptomatic, able to eat regular diet] : Dysphagia: Grade 1 - Symptomatic, able to eat regular diet [Mucosal Pain] : mucosal pain [Dysphagia] : no dysphagia [FreeTextEntry2] : as noted in HPI [FreeTextEntry4] : as in HPI [de-identified] : hyperpigmentation neck

## 2018-11-20 NOTE — DISEASE MANAGEMENT
[Pathological] : TNM Stage: p [I] : I [FreeTextEntry4] : p16 positive right tonsil cancer [TTNM] : 2 [NTNM] : 0 [MTNM] : 0 [de-identified] : 5936cGy [de-identified] : 6996 cGy [de-identified] : oropharynx/ neck

## 2018-11-26 NOTE — VITALS
[Maximal Pain Intensity: 4/10] : 4/10 [Least Pain Intensity: 0/10] : 0/10 [Pain Location: ___] : Pain Location: [unfilled] [Adjuvant (neuroleptics)] : adjuvant (neuroleptics) [80: Normal activity with effort; some signs or symptoms of disease.] : 80: Normal activity with effort; some signs or symptoms of disease.  [ECOG Performance Status: 1 - Restricted in physically strenuous activity but ambulatory and able to carry out work of a light or sedentary nature] : Performance Status: 1 - Restricted in physically strenuous activity but ambulatory and able to carry out work of a light or sedentary nature, e.g., light house work, office work

## 2018-11-27 VITALS
RESPIRATION RATE: 16 BRPM | WEIGHT: 182 LBS | HEART RATE: 83 BPM | BODY MASS INDEX: 25.38 KG/M2 | SYSTOLIC BLOOD PRESSURE: 122 MMHG | OXYGEN SATURATION: 100 % | DIASTOLIC BLOOD PRESSURE: 86 MMHG

## 2018-11-27 VITALS — SYSTOLIC BLOOD PRESSURE: 116 MMHG | HEART RATE: 92 BPM | RESPIRATION RATE: 16 BRPM | DIASTOLIC BLOOD PRESSURE: 86 MMHG

## 2018-11-27 NOTE — DISEASE MANAGEMENT
[Pathological] : TNM Stage: p [I] : I [FreeTextEntry4] : p16 positive right tonsil cancer [TTNM] : 2 [NTNM] : 0 [MTNM] : 0 [de-identified] : 6996cGy [de-identified] : 6996 cGy [de-identified] : oropharynx/ neck

## 2018-11-27 NOTE — REVIEW OF SYSTEMS
[Dysphagia: Grade 1 - Symptomatic, able to eat regular diet] : Dysphagia: Grade 1 - Symptomatic, able to eat regular diet [Esophagitis: Grade 1 - Asymptomatic; clinical or diagnostic observations only; intervention not indicated] : Esophagitis: Grade 1 - Asymptomatic; clinical or diagnostic observations only; intervention not indicated [Fatigue: Grade 1 - Fatigue relieved by rest] : Fatigue: Grade 1 - Fatigue relieved by rest [Mucositis Oral: Grade 2 - Moderate pain; not interfering with oral intake; modified diet indicated] : Mucositis Oral: Grade 2 - Moderate pain; not interfering with oral intake; modified diet indicated [Xerostomia: Grade 2 - Moderate symptoms; oral intake alterations (e.g., copious water, other lubricants, diet limited to purees and/or soft, moist foods); unstimulated saliva 0.1 to 0.2 ml/min] : Xerostomia: Grade 2 - Moderate symptoms; oral intake alterations (e.g., copious water, other lubricants, diet limited to purees and/or soft, moist foods); unstimulated saliva 0.1 to 0.2 ml/min [Oral Pain: Grade 2 - Moderate pain; limiting instrumental ADL] : Oral Pain: Grade 2 - Moderate pain; limiting instrumental ADL [Salivary duct inflammation: Grade 2 - Thick, ropy, sticky saliva; markedly altered taste; alteration in diet indicated; secretion-induced symptoms; limiting instrumental ADL] : Salivary duct inflammation: Grade 2 - Thick, ropy, sticky saliva; markedly altered taste; alteration in diet indicated; secretion-induced symptoms; limiting instrumental ADL [Dysgeusia: Grade 2 - Altered taste with change in diet (e.g., oral supplements); noxious or unpleasant taste; loss of taste] : Dysgeusia: Grade 2 - Altered taste with change in diet (e.g., oral supplements); noxious or unpleasant taste; loss of taste [Odynophagia] : odynophagia [Mucosal Pain] : mucosal pain [Dysphagia] : no dysphagia [FreeTextEntry2] : as noted in HPI [FreeTextEntry4] : as in HPI [de-identified] : hyperpigmentation neck

## 2018-11-27 NOTE — HISTORY OF PRESENT ILLNESS
[FreeTextEntry1] : 11/27/18-OTV - Mr. Jimbo Martinez has completed  6996cGy/6996 cGy to the oropharynx/neck.\par Today he states that he continues to feels fatigued. Notes pain posterior upper palate and throat with swallowing 6/10. Tolerating soft foods but notes appetite decreased.  Taking gabapentin 900mg TID. Using bicarbonate 3-4x day and MMW rinse before meals.\par Participating in Conversa chats.\par \par \par 11/20/18-OTV- Mr. Jimbo Martinez has completed  5936cGy/6996 cGy to the oropharynx/neck.\par Today he states that he continues to feels fatigued. Notes pain right side of mouth and upper palate 4/10. Notes that swallowing has become a little more difficult but he is able to eat most foods. Foods taste salty or bland.  Taking gabapentin 900mg TID. Using bicarbonate 3-4x day and MMW rinse before meals.  Notes intermittent dizziness which is helped with rest. Using prevident toothpaste, aquaphor and performing H&N exercises.  Participating in conversa chats which he continues to find helpful.\par \par 11/13/18- OTV- Mr. Jimbo Martinez has completed 5088 cGy/6996 cGy to the oropharynx/neck.\par Today he states that he feels more fatigued. Notes pain right side of mouth and upper palate 5/10. Taking gabapentin 900mg TID.\par Using bicarbonate 3-4x day and MMW rinse before meals. Using prevident toothpaste, aquaphor and performing H&N exercises.  Participating in conversa chats which he finds helpful.\par \par 11/6/18- OTV- Mr. Jimbo Martinez has completed 4028 cGy/6996 cGy to the oropharynx/neck.\par Today he states that he feels fatigued. Notes pain right side of mouth 3/10. Taking gabapentin 900mg TID but ran out of Gabapentin over weekend and used tylenol for pain. Denies dysphagia. Food tastes salty.  Uses bicarbonate 4x day and MMW rinse before meals. Using prevident toothpaste. Using aquaphor, doing H&N exercises and participating in conversa chats.\par \par 10/30/18 OTV- Mr. Jimbo Martinez has completed 2968 cGy/6996 cGy to the oropharynx/neck.\par Today he notes fatigue, dry mouth, and taste has changed. Notes that everything tastes salty. He complains of right throat pain 2/10 He increased gabapentin to 900-600-600 mg this AM.  Not using tylenol in addition to gabapentin as advised. Using bicarbonate, just started MMW. Using prevident toothpaste. Using aquaphor, doing H&N exercises and participating in conversa chats.\par \par 10/23/18- OTV- Mr. Jimbo Martinez has completed 1908cGy/6996 cGy to the oropharynx/neck.\par Today he notes fatigue, dry mouth and right throat pain 2/10. Taking gabapentin 300mg-600mg. Appetite is good. Denies dysphagia or dysphagia. Neck burns post RT but then resolves. Using aquaphor and biotene. Not receiving conversa chats yet, wrong #.\par \par 10/16/18-OTV- Mr. Jimbo Martinez has completed 1908/ 6996cGy to the oropharynx/neck.\par Today he notes a little fatigue relieved with rest. Denies throat pain of dysphagia. Appetite is good. No new issues. Mild headache and neck "burning" since beginning RT. Has not started skin care. Will enroll on [employer].\par \par Oncologic History\par Mr. 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 He was referred to Dr. Laguna (ENT) who recommended a MRI. MRI neck 8/15/18 showed a 3.7 x 2.4 x 2.2 cm enhancing lobular mass at the right palatine tonsil with inferior extension to the glossotonsillar sulcus.\par \par He consulted with  Dr. Galicia (ENT) who referred him to Dr. Carey and was then referred to Dr. Barrios for Right tonsillar lesion which was clinically suspicious for cancer. \par \par He underwent a DL/biopsy by Dr. Barrios 9/4/18 which showed exophytic right tonsil tumor extending into soft palate adjacent to uvula, involving entire right tonsil bed and lateral pharyngeal wall and extending into the Rt GTS.  Right tonsillar biopsy showed invasive squamous cell carcinoma, moderately differentiated and strongly and diffusely positive for p16.\par \par PET/CT 9/12/18 showed a right palatine tonsil mass measuring 3.7 x 2.4 x 2.2 cm which shows abnormal FDG activity with SUV max 11.9. There was 2 tiny nodules in the right middle lobe with FDG activity and a tiny focus of FDG activity in the liver. MRI abd 9/20/18- no liver lesions identified. \par \par His case was presented at tumor board with  consensus is for CRT. 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 He was referred to Shaina Munoz and [facility].\par \par Today he notes pain left side of tongue that has improved over past few days. Not sure if it is post anesthesia injection and has slight abdominal discomfort. Denies throat pain or dysphagia. Notes 25 lb wt loss over past 5 lbs due to "lifestyle changes" including not drinking alcohol. Today he has an appt. to see Dr. Cuevas.\par

## 2019-01-02 ENCOUNTER — APPOINTMENT (OUTPATIENT)
Dept: RADIATION ONCOLOGY | Facility: CLINIC | Age: 59
End: 2019-01-02
Payer: COMMERCIAL

## 2019-01-02 VITALS
SYSTOLIC BLOOD PRESSURE: 135 MMHG | RESPIRATION RATE: 16 BRPM | BODY MASS INDEX: 26.29 KG/M2 | HEART RATE: 65 BPM | WEIGHT: 188.5 LBS | OXYGEN SATURATION: 99 % | DIASTOLIC BLOOD PRESSURE: 89 MMHG

## 2019-01-02 PROCEDURE — 99024 POSTOP FOLLOW-UP VISIT: CPT

## 2019-01-02 RX ORDER — SILVER SULFADIAZINE 10 MG/G
1 CREAM TOPICAL TWICE DAILY
Qty: 1 | Refills: 2 | Status: DISCONTINUED | COMMUNITY
Start: 2018-11-20 | End: 2019-01-02

## 2019-01-02 NOTE — REASON FOR VISIT
[Head and Neck Cancer] : head and neck cancer [Spouse] : spouse [Post-Treatment Evaluation] : post-treatment evaluation for

## 2019-01-02 NOTE — DISEASE MANAGEMENT
[Pathological] : TNM Stage: p [I] : I [FreeTextEntry4] : p16 positive right tonsil cancer [TTNM] : 2 [NTNM] : 0 [MTNM] : 0 [de-identified] : 6996cGy [de-identified] : 6996 cGy [de-identified] : oropharynx/ neck

## 2019-01-02 NOTE — VITALS
[Least Pain Intensity: 0/10] : 0/10 [Adjuvant (neuroleptics)] : adjuvant (neuroleptics) [ECOG Performance Status: 1 - Restricted in physically strenuous activity but ambulatory and able to carry out work of a light or sedentary nature] : Performance Status: 1 - Restricted in physically strenuous activity but ambulatory and able to carry out work of a light or sedentary nature, e.g., light house work, office work [Maximal Pain Intensity: 0/10] : 0/10 [80: Normal activity with effort; some signs or symptoms of disease.] : 80: Normal activity with effort; some signs or symptoms of disease.

## 2019-01-02 NOTE — PHYSICAL EXAM
[General Appearance - Alert] : alert [General Appearance - In No Acute Distress] : in no acute distress [Oriented To Time, Place, And Person] : oriented to person, place, and time [Normal] : well developed, well nourished, in no acute distress [Hearing Threshold Finger Rub Not [place]] : hearing was normal [Normal Oral Cavity] : oral cavity was normal [Abdomen Soft] : soft [Cervical Lymph Nodes Enlarged Posterior Bilaterally] : posterior cervical [Cervical Lymph Nodes Enlarged Anterior Bilaterally] : anterior cervical [Supraclavicular Lymph Nodes Enlarged Bilaterally] : supraclavicular [Skin Color & Pigmentation] : normal skin color and pigmentation [] : no rash [No Focal Deficits] : no focal deficits [de-identified] : mild residual mucositis in right tonsil and adjacent soft palate. No other lesions in oral cavity [de-identified] : mild lymphedema anteriorly

## 2019-01-02 NOTE — REVIEW OF SYSTEMS
[Odynophagia] : odynophagia [Mucosal Pain] : mucosal pain [Fatigue: Grade 1 - Fatigue relieved by rest] : Fatigue: Grade 1 - Fatigue relieved by rest [Salivary duct inflammation: Grade 2 - Thick, ropy, sticky saliva; markedly altered taste; alteration in diet indicated; secretion-induced symptoms; limiting instrumental ADL] : Salivary duct inflammation: Grade 2 - Thick, ropy, sticky saliva; markedly altered taste; alteration in diet indicated; secretion-induced symptoms; limiting instrumental ADL [Dysphagia] : no dysphagia [FreeTextEntry2] : as noted in HPI [FreeTextEntry4] : as in HPI [de-identified] : hyperpigmentation neck [Constipation: Grade 0] : Constipation: Grade 0 [Diarrhea: Grade 0] : Diarrhea: Grade 0 [Dysphagia: Grade 0] : Dysphagia: Grade 0 [Oral Pain: Grade 1 - Mild pain] : Oral Pain: Grade 1 - Mild pain [Dysgeusia: Grade 1- Altered taste but no change in diet] : Dysgeusia: Grade 1 - Altered taste but no change in diet [Negative] : Heme/Lymph [Mucositis Oral: Grade 1 - Asymptomatic or mild symptoms; intervention not indicated] : Mucositis Oral: Grade 1 - Asymptomatic or mild symptoms; intervention not indicated [Xerostomia: Grade 2 - Moderate symptoms; oral intake alterations (e.g., copious water, other lubricants, diet limited to purees and/or soft, moist foods); unstimulated saliva 0.1 to 0.2 ml/min] : Xerostomia: Grade 2 - Moderate symptoms; oral intake alterations (e.g., copious water, other lubricants, diet limited to purees and/or soft, moist foods); unstimulated saliva 0.1 to 0.2 ml/min [Fever] : no fever [Chills] : no chills [Night Sweats] : no night sweats

## 2019-01-02 NOTE — HISTORY OF PRESENT ILLNESS
[FreeTextEntry1] : Mr. 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 He tolerated treatment without unexpected complications.\par \par 1/2/2019-PTE\par Today he notes fatigue, dry mouth and slight throat pain 2/10 with last meal of day. He is taking gabapentin 600mg TID but has limited pain throughout day.\par Using biotene 1-2x day, baking soda 4x/day, H&N exercises most days and prevident toothpaste daily. Appetite is good. Taste is approximately 35 %. Denies alcohol or smoking. He has an appt. with PMD mid January.\par \par Oncologic History\par Mr. 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 He was referred to Dr. Laguna (ENT) who recommended a MRI. MRI neck 8/15/18 showed a 3.7 x 2.4 x 2.2 cm enhancing lobular mass at the right palatine tonsil with inferior extension to the glossotonsillar sulcus.\par \par He consulted with  Dr. Galicia (ENT) who referred him to Dr. Carey and was then referred to Dr. Barrios for Right tonsillar lesion which was clinically suspicious for cancer. \par \par He underwent a DL/biopsy by Dr. Barrios 9/4/18 which showed exophytic right tonsil tumor extending into soft palate adjacent to uvula, involving entire right tonsil bed and lateral pharyngeal wall and extending into the Rt GTS.  Right tonsillar biopsy showed invasive squamous cell carcinoma, moderately differentiated and strongly and diffusely positive for p16.\par \par PET/CT 9/12/18 showed a right palatine tonsil mass measuring 3.7 x 2.4 x 2.2 cm which shows abnormal FDG activity with SUV max 11.9. There was 2 tiny nodules in the right middle lobe with FDG activity and a tiny focus of FDG activity in the liver. MRI abd 9/20/18- no liver lesions identified. \par \par His case was presented at tumor board with  consensus is for CRT. 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 He was referred to Shaina Munoz and [facility].\par \par Today he notes pain left side of tongue that has improved over past few days. Not sure if it is post anesthesia injection and has slight abdominal discomfort. Denies throat pain or dysphagia. Notes 25 lb wt loss over past 5 lbs due to "lifestyle changes" including not drinking alcohol. Today he has an appt. to see Dr. Cuevas.\par

## 2019-02-24 ENCOUNTER — FORM ENCOUNTER (OUTPATIENT)
Age: 59
End: 2019-02-24

## 2019-02-25 ENCOUNTER — APPOINTMENT (OUTPATIENT)
Dept: OTOLARYNGOLOGY | Facility: CLINIC | Age: 59
End: 2019-02-25
Payer: COMMERCIAL

## 2019-02-25 ENCOUNTER — OUTPATIENT (OUTPATIENT)
Dept: OUTPATIENT SERVICES | Facility: HOSPITAL | Age: 59
LOS: 1 days | End: 2019-02-25
Payer: COMMERCIAL

## 2019-02-25 VITALS
HEIGHT: 71 IN | HEART RATE: 61 BPM | OXYGEN SATURATION: 98 % | BODY MASS INDEX: 26.32 KG/M2 | DIASTOLIC BLOOD PRESSURE: 91 MMHG | WEIGHT: 188 LBS | SYSTOLIC BLOOD PRESSURE: 142 MMHG

## 2019-02-25 DIAGNOSIS — Z98.890 OTHER SPECIFIED POSTPROCEDURAL STATES: Chronic | ICD-10-CM

## 2019-02-25 DIAGNOSIS — Z41.9 ENCOUNTER FOR PROCEDURE FOR PURPOSES OTHER THAN REMEDYING HEALTH STATE, UNSPECIFIED: Chronic | ICD-10-CM

## 2019-02-25 LAB — GLUCOSE BLDC GLUCOMTR-MCNC: 88 MG/DL — SIGNIFICANT CHANGE UP (ref 70–99)

## 2019-02-25 PROCEDURE — 31575 DIAGNOSTIC LARYNGOSCOPY: CPT

## 2019-02-25 PROCEDURE — 78815 PET IMAGE W/CT SKULL-THIGH: CPT

## 2019-02-25 PROCEDURE — 78815 PET IMAGE W/CT SKULL-THIGH: CPT | Mod: 26

## 2019-02-25 PROCEDURE — A9552: CPT

## 2019-02-25 PROCEDURE — 99213 OFFICE O/P EST LOW 20 MIN: CPT | Mod: 25

## 2019-02-25 PROCEDURE — 82962 GLUCOSE BLOOD TEST: CPT

## 2019-02-28 ENCOUNTER — TRANSCRIPTION ENCOUNTER (OUTPATIENT)
Age: 59
End: 2019-02-28

## 2019-03-04 NOTE — DISCUSSION/SUMMARY
[Cancer Type / Location / Histology Subtype: ________] : Cancer Type / Location / Histology Subtype: [unfilled] [Diagnosis Date (year): ____] : Diagnosis Date (year): [unfilled] [I] : I [Surgery] : Surgery: Yes [Surgery Date(s) (year): ____] : Surgery Date(s) (year): [unfilled] [Surgical Procedure / Location / Findings: _________] : Surgical Procedure / Location / Findings: [unfilled] [Radiation] : Radiation: Yes [Body Area Treated: _________] : Body Area Treated: [unfilled] [End Date (year): ____] : End Date (year): [unfilled] [Follow up with Surgeon in _____] : Follow up with Surgeon in [unfilled] [Follow up with Radiation MD in _____] : Follow up with Radiation MD in [unfilled] [Scans: ______] : Scans: [unfilled] [Systemic Therapy (chemotherapy, hormonal therapy, other)] : Systemic Therapy (chemotherapy, hormonal therapy, other): No [Need for onging (adjuvant) treatment for cancer?] : Need for onging (adjuvant) treatment for cancer? No [FreeTextEntry8] : Bryanna Ham [FreeTextEntry9] : Mailed 3/4/2019

## 2019-03-21 NOTE — ASSESSMENT
[FreeTextEntry1] : 59 yo M s/p definitive RT for T2N0M0 SCCa of the right tonsil with DENISE on clinical exam\par -pt to obtain 3 mo post-RT scan, possibly today\par -will see Dr Munoz in 2mo and Dr Barrios in 4 months

## 2019-03-21 NOTE — PROCEDURE
[de-identified] : flexible fiberoptic laryngoscope advanced orally, no the soft palate is symmetric, elevates and is not restricted or shortened in any way.  There is mild scar contracture of the right tonsil without any mass/lesion or ulceration \par \par  larynx visualized no ulcerating or fungating masses, no leukoplakic or erythroplakic lesions or nodules true vocal cords abduct and adduct and meet in the midline no lesions

## 2019-03-21 NOTE — REASON FOR VISIT
[Subsequent Evaluation] : a subsequent evaluation for [FreeTextEntry2] : post RT for T2N0 HPV + tonsil SCCa with soft palate extension

## 2019-03-21 NOTE — HISTORY OF PRESENT ILLNESS
[de-identified] : Mr. 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 He tolerated treatment without unexpected complications. [FreeTextEntry1] : PT reports being able to work throughought treatment, feels as though voice is close to normal, no dysphagia, only mild loss of appettite, energy is good, no masses, no odynophagia or globus

## 2019-03-21 NOTE — ASSESSMENT
[FreeTextEntry1] : 57 yo M s/p definitive RT for T2N0M0 SCCa of the right tonsil with DENISE on clinical exam\par -pt to obtain 3 mo post-RT scan, possibly today\par -will see Dr Munoz in 2mo and Dr Barrios in 4 months

## 2019-03-21 NOTE — REVIEW OF SYSTEMS
[Throat Clearing] : throat clearing [Throat Dryness] : throat dryness [Negative] : Nasal [Hoarseness] : no hoarseness [Throat Pain] : no throat pain [Swelling Neck] : no neck swelling [Swelling Face] : no face swelling [Fever] : no fever [Chills] : no chills [Chest Pain] : no chest pain [Shortness Of Breath] : no shortness of breath [Swollen Glands In The Neck] : no swollen glands in the neck

## 2019-03-21 NOTE — PHYSICAL EXAM
[Midline] : trachea located in midline position [Laryngoscopy Performed] : laryngoscopy was performed, see procedure section for findings [Normal] : no rashes [de-identified] : minimal fibrosis or RT changes [de-identified] : mild scar retraction of right tonsil/soft palate [de-identified] : minimal RT changes

## 2019-03-21 NOTE — PHYSICAL EXAM
[Midline] : trachea located in midline position [Laryngoscopy Performed] : laryngoscopy was performed, see procedure section for findings [Normal] : no rashes [de-identified] : minimal fibrosis or RT changes [de-identified] : mild scar retraction of right tonsil/soft palate [de-identified] : minimal RT changes

## 2019-03-21 NOTE — ADDENDUM
[FreeTextEntry1] : Speech Pathology:\par \par Pt seen today in conjunction with Dr. Barrios [facility].\par \par Briefly, patient with T2NoMo HPV+ R tonsil SCC s/p course of chemoradiation completed on 11/27/18.  Pt reports today that he is doing well.  No dysphagia or odynophagia. Taste is generally back.  Max mouth opening today was measured at 45 mm., which is within the normal range of 40-50mm.\par \par Pt encouraged to continue with regular diet and thin liquids.  Reiterative counseling also provided re: need to perform head/neck stretching and swallowing exercises to maintain speech and swallowing functions.  He was also instructed in performing jaw ROM exercises to prevent trismus.\par \par Pravin Munguia MS, CCC-SLP, BCS-S\par Board-Certified Specialist in Swallowing Disorders\par Senior Speech Pathologist\par

## 2019-03-21 NOTE — HISTORY OF PRESENT ILLNESS
[de-identified] : Mr. 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 He tolerated treatment without unexpected complications. [FreeTextEntry1] : PT reports being able to work throughought treatment, feels as though voice is close to normal, no dysphagia, only mild loss of appettite, energy is good, no masses, no odynophagia or globus

## 2019-03-21 NOTE — PROCEDURE
[de-identified] : flexible fiberoptic laryngoscope advanced orally, no the soft palate is symmetric, elevates and is not restricted or shortened in any way.  There is mild scar contracture of the right tonsil without any mass/lesion or ulceration \par \par  larynx visualized no ulcerating or fungating masses, no leukoplakic or erythroplakic lesions or nodules true vocal cords abduct and adduct and meet in the midline no lesions

## 2019-04-18 ENCOUNTER — APPOINTMENT (OUTPATIENT)
Dept: RADIATION ONCOLOGY | Facility: CLINIC | Age: 59
End: 2019-04-18
Payer: COMMERCIAL

## 2019-04-18 VITALS
OXYGEN SATURATION: 100 % | BODY MASS INDEX: 26.81 KG/M2 | WEIGHT: 192.2 LBS | SYSTOLIC BLOOD PRESSURE: 136 MMHG | HEART RATE: 62 BPM | DIASTOLIC BLOOD PRESSURE: 94 MMHG

## 2019-04-18 PROCEDURE — 99214 OFFICE O/P EST MOD 30 MIN: CPT | Mod: 25

## 2019-04-18 PROCEDURE — 31575 DIAGNOSTIC LARYNGOSCOPY: CPT

## 2019-04-18 RX ORDER — GABAPENTIN 300 MG/1
300 CAPSULE ORAL 3 TIMES DAILY
Qty: 270 | Refills: 2 | Status: DISCONTINUED | COMMUNITY
Start: 2018-10-16 | End: 2019-04-18

## 2019-04-18 RX ORDER — LIDOCAINE HYDROCHLORIDE 20 MG/ML
2 SOLUTION OROPHARYNGEAL
Qty: 1 | Refills: 4 | Status: DISCONTINUED | COMMUNITY
Start: 2018-10-16 | End: 2019-04-18

## 2019-04-18 NOTE — REASON FOR VISIT
[Spouse] : spouse [Head and Neck Cancer] : head and neck cancer [Routine Follow-Up] : routine follow-up visit for

## 2019-04-18 NOTE — PHYSICAL EXAM
[General Appearance - In No Acute Distress] : in no acute distress [General Appearance - Alert] : alert [Normal Oral Cavity] : oral cavity was normal [Abdomen Soft] : soft [Skin Color & Pigmentation] : normal skin color and pigmentation [Oriented To Time, Place, And Person] : oriented to person, place, and time [No Focal Deficits] : no focal deficits [Sclera] : the sclera and conjunctiva were normal [Examination Of The Oral Cavity] : the lips and gums were normal [Oropharynx] : The oropharynx was normal [Normal] : no respiratory distress, lungs were clear to auscultation bilaterally [Murmurs] : no murmurs present [Heart Sounds] : normal S1 and S2 [de-identified] : mild lymphedema anteriorly  [de-identified] : no palpable cervical or supraclavicular lymphadenopathy

## 2019-04-18 NOTE — HISTORY OF PRESENT ILLNESS
[FreeTextEntry1] : Mr. 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 He tolerated treatment without unexpected complications.\par \par 4/18/19 - He returns today for routine follow-up.  He was last seen here on 1/2/19.  At that time, he was reporting fatigue, dry mouth, slight throat pain controlled with gabapentin and dysgeusia.  Plan was for PET/CT in 2 months and f/u with Dr. Barrios and PMD.  He Last saw Dr. Barrios on 2/25/19; DENISE on clinical exam.  Also saw SLP that day, advised to continue regular diet with thin liquids and head/neck exercises.  Last saw PMD in October.  He states he is due for routine visit with PMD and will schedule soon.   \par \par He will see Dr. Shmuel Alvarado in May for workup of L'[place]'s which happens frequently. \par \par PET/CT 2/25/19 Impression: \par Since 9/12/2018, there has been a decrease in the size and FDG activity of the mass in the right tonsil, now with only minimal activity remaining within the edematous soft tissue at that site.  This likely represents postradiation change.  \par \par Today he reports feeling well overall.  He reports continued dry mouth, which occasionally makes swallowing difficult.  He is using Biotene mouth rinses, humidifier, taking liquids with meals, and sugar-free hard candy with relief.  He is tolerating a regular diet.  He is seeing OT biweekly for lymphedema therapy and doing head and neck exercises regularly.  He reports his taste is 65-70%.  He also reports continued fatigue, which is slowly improving.  \par \par Oncologic History\par Mr. 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 He was referred to Dr. Laguna (ENT) who recommended a MRI. MRI neck 8/15/18 showed a 3.7 x 2.4 x 2.2 cm enhancing lobular mass at the right palatine tonsil with inferior extension to the glossotonsillar sulcus.\par \par He consulted with  Dr. Galicia (ENT) who referred him to Dr. Carey and was then referred to Dr. Barrios for Right tonsillar lesion which was clinically suspicious for cancer. \par \par He underwent a DL/biopsy by Dr. Barrios 9/4/18 which showed exophytic right tonsil tumor extending into soft palate adjacent to uvula, involving entire right tonsil bed and lateral pharyngeal wall and extending into the Rt GTS.  Right tonsillar biopsy showed invasive squamous cell carcinoma, moderately differentiated and strongly and diffusely positive for p16.\par \par PET/CT 9/12/18 showed a right palatine tonsil mass measuring 3.7 x 2.4 x 2.2 cm which shows abnormal FDG activity with SUV max 11.9. There was 2 tiny nodules in the right middle lobe with FDG activity and a tiny focus of FDG activity in the liver. MRI abd 9/20/18- no liver lesions identified. \par \par His case was presented at tumor board with  consensus is for CRT. 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 He was referred to Shaina Munoz and [facility].\par \par Today he notes pain left side of tongue that has improved over past few days. Not sure if it is post anesthesia injection and has slight abdominal discomfort. Denies throat pain or dysphagia. Notes 25 lb wt loss over past 5 lbs due to "lifestyle changes" including not drinking alcohol. Today he has an appt. to see Dr. Cuevas.\par

## 2019-04-18 NOTE — PROCEDURE
[Topical Lidocaine] : topical lidocaine [Flexible Endoscope] : examined with the flexible endoscope [Photographs Taken] : photographs taken [Normal] : the true vocal cords ~T were normal

## 2019-04-18 NOTE — DISEASE MANAGEMENT
[Pathological] : TNM Stage: p [I] : I [FreeTextEntry4] : p16 positive right tonsil cancer [TTNM] : 2 [NTNM] : 0 [MTNM] : 0 [de-identified] : 6996cGy [de-identified] : 6996 cGy [de-identified] : oropharynx/ neck

## 2019-04-18 NOTE — VITALS
[Least Pain Intensity: 0/10] : 0/10 [Maximal Pain Intensity: 0/10] : 0/10 [Adjuvant (neuroleptics)] : adjuvant (neuroleptics) [ECOG Performance Status: 1 - Restricted in physically strenuous activity but ambulatory and able to carry out work of a light or sedentary nature] : Performance Status: 1 - Restricted in physically strenuous activity but ambulatory and able to carry out work of a light or sedentary nature, e.g., light house work, office work [90: Able to carry normal activity; minor signs or symptoms of disease.] : 90: Able to carry normal activity; minor signs or symptoms of disease.

## 2019-04-18 NOTE — REVIEW OF SYSTEMS
[Mucositis Oral: Grade 1 - Asymptomatic or mild symptoms; intervention not indicated] : Mucositis Oral: Grade 1 - Asymptomatic or mild symptoms; intervention not indicated [Fatigue: Grade 1 - Fatigue relieved by rest] : Fatigue: Grade 1 - Fatigue relieved by rest [Xerostomia: Grade 2 - Moderate symptoms; oral intake alterations (e.g., copious water, other lubricants, diet limited to purees and/or soft, moist foods); unstimulated saliva 0.1 to 0.2 ml/min] : Xerostomia: Grade 2 - Moderate symptoms; oral intake alterations (e.g., copious water, other lubricants, diet limited to purees and/or soft, moist foods); unstimulated saliva 0.1 to 0.2 ml/min [Oral Pain: Grade 1 - Mild pain] : Oral Pain: Grade 1 - Mild pain [Salivary duct inflammation: Grade 2 - Thick, ropy, sticky saliva; markedly altered taste; alteration in diet indicated; secretion-induced symptoms; limiting instrumental ADL] : Salivary duct inflammation: Grade 2 - Thick, ropy, sticky saliva; markedly altered taste; alteration in diet indicated; secretion-induced symptoms; limiting instrumental ADL [Dysgeusia: Grade 1- Altered taste but no change in diet] : Dysgeusia: Grade 1 - Altered taste but no change in diet [Fatigue] : fatigue [Negative] : Respiratory [Dysphagia: Grade 1 - Symptomatic, able to eat regular diet] : Dysphagia: Grade 1 - Symptomatic, able to eat regular diet [Fever] : no fever [Chills] : no chills [Hoarseness] : no hoarseness [Odynophagia] : no odynophagia [FreeTextEntry4] : occasional dysphagia associated with dry mouth

## 2019-05-13 ENCOUNTER — APPOINTMENT (OUTPATIENT)
Dept: NEUROLOGY | Facility: CLINIC | Age: 59
End: 2019-05-13
Payer: COMMERCIAL

## 2019-05-13 VITALS
WEIGHT: 183 LBS | BODY MASS INDEX: 25.62 KG/M2 | TEMPERATURE: 97.5 F | HEIGHT: 71 IN | HEART RATE: 64 BPM | DIASTOLIC BLOOD PRESSURE: 89 MMHG | OXYGEN SATURATION: 99 % | SYSTOLIC BLOOD PRESSURE: 147 MMHG

## 2019-05-13 PROCEDURE — 99243 OFF/OP CNSLTJ NEW/EST LOW 30: CPT

## 2019-05-13 NOTE — PHYSICAL EXAM
[FreeTextEntry1] : Gen: appears well, well-nourished, no acute distress\par \par MS: awake, alert, speech fluent, comprehension intact, follows commands\par \par CN: PERRL, EOMI, visual fields full, facial strength and sensation intact and symmetric, palate elevation symmetric, tongue midline, no tongue atrophy or fasciculations\par \par Motor: normal bulk and tone, 5/5 strength throughout, no abnormal movements\par \par Sensory: light touch and pinprick intact and symmetric throughout, including on back and anterior trunk\par \par Reflexes: 1+ symmetric throughout, no Singh’s sign, plantar responses flexor bilaterally\par \par Coordination: no dysmetria on finger to nose, Romberg negative\par \par Gait: normal, can tandem without difficulty\par \par Skin: no rash on extremities\par \par Ext: no edema\par \par CV: regular rate\par \par Ophtho: fundi not visualized

## 2019-05-13 NOTE — ASSESSMENT
[FreeTextEntry1] : Possible mild radiation myelopathy, although given cancer history should rule out neoplasm in cervical or thoracic cord with MRI C and T spine with contrast\par f/u after imaging

## 2019-05-13 NOTE — HISTORY OF PRESENT ILLNESS
[FreeTextEntry1] : 57 yo M referred by Dr. Munoz for "electric shock" in torso when he flexed his neck. This started in February 2019; he had received radiation in October / November 2018 for squamous cell head and neck cancer. He denies significant neck pain. He complains also of some forgetfulness since cancer diagnosis. His girlfriend reports numbness in his hands, although pt himself denies this. \par MRI of neck in 8/2018 reviewed - no clear cervical cord compression \par \par 10 pt review of systems performed and reviewed with patient\par Past medical history, surgical history, social history, and family history reviewed with patient\par See scanned document for details\par

## 2019-05-13 NOTE — CONSULT LETTER
[Dear  ___] : Dear  [unfilled], [Please see my note below.] : Please see my note below. [Consult Letter:] : I had the pleasure of evaluating your patient, [unfilled]. [Sincerely,] : Sincerely, [Consult Closing:] : Thank you very much for allowing me to participate in the care of this patient.  If you have any questions, please do not hesitate to contact me. [FreeTextEntry3] : Shmuel Alvarado M.D.\par Neurology, Electromyography and Neuromuscular Medicine\par [facility]\par \par  of Neurology\par [facility] [employer]

## 2019-06-10 ENCOUNTER — OUTPATIENT (OUTPATIENT)
Dept: OUTPATIENT SERVICES | Facility: HOSPITAL | Age: 59
LOS: 1 days | End: 2019-06-10

## 2019-06-10 ENCOUNTER — APPOINTMENT (OUTPATIENT)
Dept: MRI IMAGING | Facility: CLINIC | Age: 59
End: 2019-06-10
Payer: COMMERCIAL

## 2019-06-10 DIAGNOSIS — Z98.890 OTHER SPECIFIED POSTPROCEDURAL STATES: Chronic | ICD-10-CM

## 2019-06-10 DIAGNOSIS — Z41.9 ENCOUNTER FOR PROCEDURE FOR PURPOSES OTHER THAN REMEDYING HEALTH STATE, UNSPECIFIED: Chronic | ICD-10-CM

## 2019-06-10 PROCEDURE — 72157 MRI CHEST SPINE W/O & W/DYE: CPT | Mod: 26

## 2019-06-10 PROCEDURE — 72156 MRI NECK SPINE W/O & W/DYE: CPT | Mod: 26

## 2019-07-22 ENCOUNTER — APPOINTMENT (OUTPATIENT)
Dept: OTOLARYNGOLOGY | Facility: CLINIC | Age: 59
End: 2019-07-22
Payer: COMMERCIAL

## 2019-07-22 VITALS
SYSTOLIC BLOOD PRESSURE: 134 MMHG | BODY MASS INDEX: 25.62 KG/M2 | WEIGHT: 183 LBS | OXYGEN SATURATION: 98 % | HEIGHT: 71 IN | HEART RATE: 64 BPM | DIASTOLIC BLOOD PRESSURE: 83 MMHG

## 2019-07-22 PROCEDURE — 31575 DIAGNOSTIC LARYNGOSCOPY: CPT

## 2019-07-22 PROCEDURE — 99213 OFFICE O/P EST LOW 20 MIN: CPT | Mod: 25

## 2019-08-01 NOTE — ADDENDUM
[FreeTextEntry1] : Speech Pathology:\par \par Pt seen today in conjunction with Dr. Barrios in [facility].\par \par Briefly, patient with T2NoMo HPV+ R tonsil SCC s/p course of chemoradiation completed on 11/27/18. Pt reports today that he is doing well.  No dysphagia or odynophagia, no dysguesia, (+) occasional xerostomia. He continues to perfom prescribed head/neck stretching and swallowing exercises.  Max mouth opening today was measured at 49 mm., which is improved from 45 mm. in Feb 2019, and within the normal range of 40-50mm.\par \par Pt encouraged to continue with regular diet and thin liquids. Reiterative counseling also provided re: continuing to perform head/neck stretching and swallowing exercises to maintain speech and swallowing functions.\par \par Pravin MunguiaMS, CCC-SLP, BCS-S\par Board-Certified Specialist in Swallowing Disorders\par Senior Speech Pathologist

## 2019-08-01 NOTE — PHYSICAL EXAM
[Normal] : orientation to person, place, and time: normal [de-identified] : Right sided redness consistnet with post radiation edema

## 2019-08-01 NOTE — ASSESSMENT
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 Munoz\par - CT-Chest as planned in September\par - Continue follow up with OT/lymphedema clinic\par - Follow up in January 2020

## 2019-08-01 NOTE — REASON FOR VISIT
[Subsequent Evaluation] : a subsequent evaluation for [FreeTextEntry2] : follow up T2N0 HPV + tonsil SCC

## 2019-08-01 NOTE — PROCEDURE
[Image(s) Captured] : image(s) captured and filed [Unable to Cooperate with Mirror] : patient unable to cooperate with mirror [None] : none [Flexible Endoscope] : examined with the flexible endoscope [Normal] : the false vocal folds were pink and regular, the ventricular sulcus was open, the true vocal folds were glistening white, tense and of equal length, mobility, and height [de-identified] : Transoral laryngoscopy

## 2019-08-26 ENCOUNTER — APPOINTMENT (OUTPATIENT)
Dept: NEUROLOGY | Facility: CLINIC | Age: 59
End: 2019-08-26
Payer: COMMERCIAL

## 2019-08-26 VITALS
SYSTOLIC BLOOD PRESSURE: 141 MMHG | BODY MASS INDEX: 24.5 KG/M2 | OXYGEN SATURATION: 98 % | HEIGHT: 71 IN | DIASTOLIC BLOOD PRESSURE: 94 MMHG | WEIGHT: 175 LBS | HEART RATE: 85 BPM

## 2019-08-26 DIAGNOSIS — R29.818 OTHER SYMPTOMS AND SIGNS INVOLVING THE NERVOUS SYSTEM: ICD-10-CM

## 2019-08-26 DIAGNOSIS — K30 FUNCTIONAL DYSPEPSIA: ICD-10-CM

## 2019-08-26 PROCEDURE — 99214 OFFICE O/P EST MOD 30 MIN: CPT

## 2019-08-26 NOTE — HISTORY OF PRESENT ILLNESS
[FreeTextEntry1] : [ ] New symptom:\par Claustrophobia \par \par [ ] Problems inadequately controlled:\par 1. GI upset\par 2. anxiety \par \par [ ] Stable / Improved: \par 1. Lhmichellee's sign- electric shock down back \par \par Interval history: Had MRIs done, unremarkable, but had severe claustrophobia and since then has experienced several other episodes of claustrophobia, and overall anxiety has worsened\par Also GI upset has worsened since he went on a trip to [place] and ate a lot of things he doesn't usually. \par \par Location: neck and back\par Quality: electric, tingling \par Severity: mild-moderate\par Duration: 7-8 months \par Timing: intermittent\par Context: after radiation to neck almost 1 year ago\par Modifying Factors: worse when flexing neck \par \par Data reviewed:\par Imaging (independently reviewed): MRI C and T spine reviewed - no compression of spinal cord or nerve roots, no evidence of neoplasm; some radiation changes in soft tissues\par Prior records: Dr. Barrios' note - OT for lymphedema, no evidence of recurrence, CT chest in september \par \par ROS: 13 pt review of systems performed and reviewed with patient (General, Eyes, Ears, Cardiovascular, Respiratory, Gastrointestinal, Genitourinary, Musculoskeletal, Skin, Endocrine, Hematologic, Psychiatric, Neurologic)\par Past medical history, surgical history, social history, and family history reviewed with patient\par See scanned document for details

## 2019-08-26 NOTE — PHYSICAL EXAM
[FreeTextEntry1] : Gen: appears well, well-nourished, no acute distress\par \par MS: awake, alert, speech fluent, comprehension intact, follows commands\par \par CN: PERRL, EOMI, visual fields full, facial strength and sensation intact and symmetric, palate elevation symmetric, tongue midline, no tongue atrophy or fasciculations\par \par Motor: normal bulk and tone, 5/5 strength throughout, no abnormal movements\par \par Sensory: light touch and pinprick intact and symmetric throughout, including on back and anterior trunk\par \par Reflexes: 1+ symmetric throughout, no Singh’s sign, plantar responses flexor bilaterally\par \par Coordination: no dysmetria on finger to nose, Romberg negative\par \par Gait: normal, can tandem without difficulty\par

## 2019-08-26 NOTE — ASSESSMENT
[FreeTextEntry1] : Mild effect of radiation on spinal cord most likely \par Imaging of spinal cord normal\par No further workup\par f/u psychologist for claustrophobia / anxiety\par ENT, radiation oncology f/u\par Return as needed\par

## 2019-10-17 ENCOUNTER — FORM ENCOUNTER (OUTPATIENT)
Age: 59
End: 2019-10-17

## 2019-10-18 ENCOUNTER — APPOINTMENT (OUTPATIENT)
Dept: CT IMAGING | Facility: HOSPITAL | Age: 59
End: 2019-10-18
Payer: COMMERCIAL

## 2019-10-18 ENCOUNTER — OUTPATIENT (OUTPATIENT)
Dept: OUTPATIENT SERVICES | Facility: HOSPITAL | Age: 59
LOS: 1 days | End: 2019-10-18
Payer: COMMERCIAL

## 2019-10-18 DIAGNOSIS — Z41.9 ENCOUNTER FOR PROCEDURE FOR PURPOSES OTHER THAN REMEDYING HEALTH STATE, UNSPECIFIED: Chronic | ICD-10-CM

## 2019-10-18 DIAGNOSIS — Z98.890 OTHER SPECIFIED POSTPROCEDURAL STATES: Chronic | ICD-10-CM

## 2019-10-18 PROCEDURE — 70491 CT SOFT TISSUE NECK W/DYE: CPT

## 2019-10-18 PROCEDURE — 70491 CT SOFT TISSUE NECK W/DYE: CPT | Mod: 26

## 2019-11-06 NOTE — VITALS
[Maximal Pain Intensity: 0/10] : 0/10 [Least Pain Intensity: 0/10] : 0/10 [Adjuvant (neuroleptics)] : adjuvant (neuroleptics) [90: Able to carry normal activity; minor signs or symptoms of disease.] : 90: Able to carry normal activity; minor signs or symptoms of disease.  [ECOG Performance Status: 1 - Restricted in physically strenuous activity but ambulatory and able to carry out work of a light or sedentary nature] : Performance Status: 1 - Restricted in physically strenuous activity but ambulatory and able to carry out work of a light or sedentary nature, e.g., light house work, office work

## 2019-11-07 ENCOUNTER — APPOINTMENT (OUTPATIENT)
Dept: RADIATION ONCOLOGY | Facility: CLINIC | Age: 59
End: 2019-11-07
Payer: COMMERCIAL

## 2019-11-07 VITALS
WEIGHT: 191.8 LBS | OXYGEN SATURATION: 99 % | HEART RATE: 68 BPM | RESPIRATION RATE: 16 BRPM | SYSTOLIC BLOOD PRESSURE: 134 MMHG | BODY MASS INDEX: 26.75 KG/M2 | TEMPERATURE: 98 F | DIASTOLIC BLOOD PRESSURE: 84 MMHG

## 2019-11-07 PROCEDURE — 99214 OFFICE O/P EST MOD 30 MIN: CPT | Mod: 25

## 2019-11-07 PROCEDURE — 31575 DIAGNOSTIC LARYNGOSCOPY: CPT

## 2019-11-07 RX ORDER — RANITIDINE 75 MG/1
TABLET ORAL
Refills: 0 | Status: DISCONTINUED | COMMUNITY
End: 2019-11-07

## 2019-11-07 RX ORDER — ALPRAZOLAM 0.25 MG/1
0.25 TABLET ORAL
Qty: 1 | Refills: 0 | Status: COMPLETED | COMMUNITY
Start: 2019-10-17 | End: 2019-11-07

## 2019-11-12 NOTE — REVIEW OF SYSTEMS
[Fatigue] : fatigue [Fatigue: Grade 1 - Fatigue relieved by rest] : Fatigue: Grade 1 - Fatigue relieved by rest [Dysgeusia: Grade 1- Altered taste but no change in diet] : Dysgeusia: Grade 1 - Altered taste but no change in diet [Negative] : ENT [Dysphagia: Grade 0] : Dysphagia: Grade 0 [Mucositis Oral: Grade 0] : Mucositis Oral: Grade 0  [Oral Pain: Grade 0] : Oral Pain: Grade 0 [Xerostomia: Grade 1 - Symptomatic (e.g., dry or thick saliva) without significant dietary alteration; unstimulated saliva flow >0.2 ml/min] : Xerostomia: Grade 1 - Symptomatic (e.g., dry or thick saliva) without significant dietary alteration; unstimulated saliva flow >0.2 ml/min [Salivary duct inflammation: Grade 1 - Slightly thickened saliva; slightly altered taste (e.g., metallic)] : Salivary duct inflammation: Grade 1 - Slightly thickened saliva; slightly altered taste (e.g., metallic) [Hoarseness] : no hoarseness [FreeTextEntry4] : see HPI

## 2019-11-12 NOTE — PHYSICAL EXAM
[General Appearance - Alert] : alert [General Appearance - In No Acute Distress] : in no acute distress [Sclera] : the sclera and conjunctiva were normal [Examination Of The Oral Cavity] : the lips and gums were normal [Normal Oral Cavity] : oral cavity was normal [Oropharynx] : The oropharynx was normal [Normal] : no respiratory distress, lungs were clear to auscultation bilaterally [Heart Sounds] : normal S1 and S2 [Murmurs] : no murmurs present [Abdomen Soft] : soft [Skin Color & Pigmentation] : normal skin color and pigmentation [No Focal Deficits] : no focal deficits [Oriented To Time, Place, And Person] : oriented to person, place, and time [de-identified] : good saliva, DENISE. tooth #1 seen - discolored but not painful or inflamed  [de-identified] : no palpable cervical or supraclavicular lymphadenopathy [de-identified] : mild lymphedema anteriorly

## 2019-11-12 NOTE — DISEASE MANAGEMENT
[Pathological] : TNM Stage: p [I] : I [FreeTextEntry4] : p16 positive right tonsil cancer [TTNM] : 2 [NTNM] : 0 [MTNM] : 0 [de-identified] : oropharynx/ neck

## 2019-11-12 NOTE — HISTORY OF PRESENT ILLNESS
[FreeTextEntry1] : Mr. 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 He tolerated treatment without unexpected complications.\par \par 11/7/19 - Follow-up\par Mr. Martinez returns today for routine follow-up.  He was last seen on 4/18/19.  Plan was for CT neck in 6 months, follow-up with Dr. Barrios in July, neurology for L'Hermittes, dental follow-up in May or after, see cardiology for follow-up of coronary calcifications seen on imaging, continue OT/lymphedema therapy, continue Biotene and PreviDent, try Salivamax, daily head and neck exercises.\par \par CT neck with contrast 10/18/19 - No evidence of residual or recurrent right palatine tonsil mass.  No cervical lymphadenopathy.  \par \par He saw Dr. Alvarado for evaluation of L'Hermittes and underwent MRI of the thoracic and cervical spine.  Plan is for no further workup and PRN follow-up. \par \par MRI cervical spine with and without contrast 6/10/19 - IMPRESSION:\par 1.  No MR evidence of compressive or primary cervical myelopathy. \par 2.  No signficant spinal stenosis; minor spondylosis at C5-C6 and C6-7 with neural foraminal narrowing on the left at the former.  \par 3.  Radiation sequela is present in the prevertebral soft tissues and bone marrow. \par \par MRI thoracic spine with and without contrast 6/10/19 - IMPRESSION: No evidence of compressive or primary thoracic myelopathy.  Minimal spondylosis as above but otherwise unremarkable study. \par \par He last saw Dr. Barrios on 7/22/19.  He last saw dental a few weeks ago and was recommended extraction of his right upper wisdom tooth.  He has not seen a cardiologist and would like a referral.  Saw his PMD for a routine physical yesterday.  Today, he reports feeling generally well.  He notes continue fatigued that "ebbs and flows."  He also notes unchanged xerostomia.  He denies dysphagia and odynophagia, states he is eating a regular diet.  He completed lymphedema therapy and continues to do exercises at home.  He recently stopped his zantac and is now taking a probiotic, digestive enzyme and licorice for reflux with relief of symptoms.  He is using Biotene, PreviDent and doing head and neck exercises.  He denies smoking, admits to 1-2 drinks every few months.  \par \par 4/18/19 - He returns today for routine follow-up.  He was last seen here on 1/2/19.  At that time, he was reporting fatigue, dry mouth, slight throat pain controlled with gabapentin and dysgeusia.  Plan was for PET/CT in 2 months and f/u with Dr. Barrios and PMD.  He Last saw Dr. Barrios on 2/25/19; DENISE on clinical exam.  Also saw SLP that day, advised to continue regular diet with thin liquids and head/neck exercises.  Last saw PMD in October.  He states he is due for routine visit with PMD and will schedule soon.   \par \par He will see Dr. Shmuel Alvarado in May for workup of L'Hermitte's which happens frequently. \par \par PET/CT 2/25/19 Impression: \par Since 9/12/2018, there has been a decrease in the size and FDG activity of the mass in the right tonsil, now with only minimal activity remaining within the edematous soft tissue at that site.  This likely represents postradiation change.  \par \par Today he reports feeling well overall.  He reports continued dry mouth, which occasionally makes swallowing difficult.  He is using Biotene mouth rinses, humidifier, taking liquids with meals, and sugar-free hard candy with relief.  He is tolerating a regular diet.  He is seeing OT biweekly for lymphedema therapy and doing head and neck exercises regularly.  He reports his taste is 65-70%.  He also reports continued fatigue, which is slowly improving.  \par \par Oncologic History\par Mr. 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 He was referred to Dr. Laguna (ENT) who recommended a MRI. MRI neck 8/15/18 showed a 3.7 x 2.4 x 2.2 cm enhancing lobular mass at the right palatine tonsil with inferior extension to the glossotonsillar sulcus.\par \par He consulted with  Dr. Galicia (ENT) who referred him to Dr. Carey and was then referred to Dr. Barrios for Right tonsillar lesion which was clinically suspicious for cancer. \par \par He underwent a DL/biopsy by Dr. Barrios 9/4/18 which showed exophytic right tonsil tumor extending into soft palate adjacent to uvula, involving entire right tonsil bed and lateral pharyngeal wall and extending into the Rt GTS.  Right tonsillar biopsy showed invasive squamous cell carcinoma, moderately differentiated and strongly and diffusely positive for p16.\par \par PET/CT 9/12/18 showed a right palatine tonsil mass measuring 3.7 x 2.4 x 2.2 cm which shows abnormal FDG activity with SUV max 11.9. There was 2 tiny nodules in the right middle lobe with FDG activity and a tiny focus of FDG activity in the liver. MRI abd 9/20/18- no liver lesions identified. \par \par His case was presented at tumor board with  consensus is for CRT. 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 He was referred to Shaina Munoz and [facility].\par \par Today he notes pain left side of tongue that has improved over past few days. Not sure if it is post anesthesia injection and has slight abdominal discomfort. Denies throat pain or dysphagia. Notes 25 lb wt loss over past 5 lbs due to "lifestyle changes" including not drinking alcohol. Today he has an appt. to see Dr. Cuevas.\par

## 2020-03-04 ENCOUNTER — APPOINTMENT (OUTPATIENT)
Dept: OTOLARYNGOLOGY | Facility: CLINIC | Age: 60
End: 2020-03-04
Payer: COMMERCIAL

## 2020-03-04 VITALS
HEART RATE: 103 BPM | HEIGHT: 71 IN | BODY MASS INDEX: 26.74 KG/M2 | TEMPERATURE: 98.2 F | WEIGHT: 191 LBS | SYSTOLIC BLOOD PRESSURE: 153 MMHG | OXYGEN SATURATION: 98 % | DIASTOLIC BLOOD PRESSURE: 94 MMHG

## 2020-03-04 PROCEDURE — 99213 OFFICE O/P EST LOW 20 MIN: CPT | Mod: 25

## 2020-03-04 PROCEDURE — 31575 DIAGNOSTIC LARYNGOSCOPY: CPT

## 2020-03-11 NOTE — REASON FOR VISIT
[Subsequent Evaluation] : a subsequent evaluation for [Spouse] : spouse [FreeTextEntry2] : T2N0 HPV+ R tonsil SCC

## 2020-03-11 NOTE — PROCEDURE
[Image(s) Captured] : image(s) captured and filed [Unable to Cooperate with Mirror] : patient unable to cooperate with mirror [Hoarseness] : hoarseness not clearly evaluated by indirect laryngoscopy [None] : none [Flexible Endoscope] : examined with the flexible endoscope [de-identified] : Flexible fiberoptic laryngoscope advanced orally, no oropharyngeal lesions or masses identified, larynx visualized, adequate and symmetric cord adduction and abduction noted, no vocal cord masses or lesions identified.\par  [de-identified] : surveillance of OPSCC

## 2020-03-11 NOTE — HISTORY OF PRESENT ILLNESS
[de-identified] : 59M with [id number] p16+ right tonsil SCC treated with definitive EBRT with 6996 cGy to the oropharynx/neck from 10/11/18 - 11/27/18.   He tolerated treatment without unexpected complications. \par \par He ruptured his R Achilles tendon playing basketball s/p repair 2/26/20.  Pt reports no trismus, good diet and swallowing, weight was stable until his recent injury.  No coughing, no choking, no dyspnea. He continues to have mild xerostomia which can affect deeper pitches, which he alleviates by drinking liquids.  He has altered taste, sometime has a salty/bitter aftertaste. Reports adequate fluid intake.  He has right neck tightness, continues to perform home neck exercises.  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  He has f/u appt with Alexander in July with f/u CT neck.

## 2020-03-11 NOTE — ASSESSMENT
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 No evidence of recurrence.\par \par - Follow up with Dr. Munoz and CT neck in July.\par - Follow up in November 2020.\par - RTC sooner should any worrisome symptoms develop.\par - Pt verbalized understanding of above.

## 2020-03-11 NOTE — REVIEW OF SYSTEMS
[As Noted in HPI] : as noted in HPI [Negative] : Heme/Lymph [FreeTextEntry2] : 10 lb weight loss since Achilles tendon rupture and surgery [FreeTextEntry9] : Achilles tendon rupture

## 2020-03-11 NOTE — PHYSICAL EXAM
[Laryngoscopy Performed] : laryngoscopy was performed, see procedure section for findings [Normal] : no rashes [de-identified] : no lymphedema noted [de-identified] : R tonsil DENISE, no masses or lesions

## 2020-04-13 ENCOUNTER — TRANSCRIPTION ENCOUNTER (OUTPATIENT)
Age: 60
End: 2020-04-13

## 2020-04-17 ENCOUNTER — TRANSCRIPTION ENCOUNTER (OUTPATIENT)
Age: 60
End: 2020-04-17

## 2020-07-15 ENCOUNTER — TRANSCRIPTION ENCOUNTER (OUTPATIENT)
Age: 60
End: 2020-07-15

## 2020-07-28 ENCOUNTER — OUTPATIENT (OUTPATIENT)
Dept: OUTPATIENT SERVICES | Facility: HOSPITAL | Age: 60
LOS: 1 days | End: 2020-07-28

## 2020-07-28 ENCOUNTER — APPOINTMENT (OUTPATIENT)
Dept: CT IMAGING | Facility: CLINIC | Age: 60
End: 2020-07-28
Payer: COMMERCIAL

## 2020-07-28 ENCOUNTER — RESULT REVIEW (OUTPATIENT)
Age: 60
End: 2020-07-28

## 2020-07-28 DIAGNOSIS — Z41.9 ENCOUNTER FOR PROCEDURE FOR PURPOSES OTHER THAN REMEDYING HEALTH STATE, UNSPECIFIED: Chronic | ICD-10-CM

## 2020-07-28 DIAGNOSIS — Z98.890 OTHER SPECIFIED POSTPROCEDURAL STATES: Chronic | ICD-10-CM

## 2020-07-28 PROCEDURE — 70491 CT SOFT TISSUE NECK W/DYE: CPT | Mod: 26

## 2020-07-29 NOTE — VITALS
[Maximal Pain Intensity: 0/10] : 0/10 [Adjuvant (neuroleptics)] : adjuvant (neuroleptics) [Least Pain Intensity: 0/10] : 0/10 [90: Able to carry normal activity; minor signs or symptoms of disease.] : 90: Able to carry normal activity; minor signs or symptoms of disease.  [ECOG Performance Status: 1 - Restricted in physically strenuous activity but ambulatory and able to carry out work of a light or sedentary nature] : Performance Status: 1 - Restricted in physically strenuous activity but ambulatory and able to carry out work of a light or sedentary nature, e.g., light house work, office work

## 2020-07-29 NOTE — REASON FOR VISIT
[Head and Neck Cancer] : head and neck cancer [Routine Follow-Up] : routine follow-up visit for [Spouse] : spouse

## 2020-07-30 ENCOUNTER — APPOINTMENT (OUTPATIENT)
Dept: RADIATION ONCOLOGY | Facility: CLINIC | Age: 60
End: 2020-07-30
Payer: COMMERCIAL

## 2020-07-30 VITALS
DIASTOLIC BLOOD PRESSURE: 90 MMHG | TEMPERATURE: 98.4 F | HEART RATE: 72 BPM | WEIGHT: 188 LBS | BODY MASS INDEX: 26.22 KG/M2 | OXYGEN SATURATION: 99 % | RESPIRATION RATE: 18 BRPM | SYSTOLIC BLOOD PRESSURE: 125 MMHG

## 2020-07-30 DIAGNOSIS — K21.9 GASTRO-ESOPHAGEAL REFLUX DISEASE W/OUT ESOPHAGITIS: ICD-10-CM

## 2020-07-30 PROCEDURE — 99214 OFFICE O/P EST MOD 30 MIN: CPT | Mod: 25

## 2020-07-30 PROCEDURE — 31575 DIAGNOSTIC LARYNGOSCOPY: CPT

## 2020-07-30 RX ORDER — SODIUM FLUORIDE 6 MG/ML
1.1 PASTE, DENTIFRICE DENTAL
Qty: 1 | Refills: 4 | Status: ACTIVE | COMMUNITY
Start: 2018-10-16 | End: 1900-01-01

## 2020-07-30 NOTE — HISTORY OF PRESENT ILLNESS
[FreeTextEntry1] : Mr. 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 He tolerated treatment without unexpected complications.\par \par 7/30/20- Follow-up\par Mr. Martinez returns for routine follow-up.  He was last seen on 11/7/19.  Plan was for CT neck and f/u in 8 months, f/u with Dr. Barrios in 4 months, continue PreviDent, Biotene, and head and neck exercises, referral to cardiology at [facility].  Dental recommendations were also discussed - no extraction for tooth #1 which received between 60-66 Gy.  \par \par CT neck with contrast 7/28/20 \par - No evidence of recurrent oropharyngeal mass or cervical lymphadenopathy.\par - However, a new subcentimeter polypoid focus of soft tissue density is identified along the right anterior wall of the cervical trachea, approximately 3 cm below the level of the cords. This is indeterminate for a polyp or mass, or possibly dense mucoid debris although it lies anteriorly rather than posteriorly. Direct inspection is suggested, or at least short interval follow-up. Mild asymmetric fullness of the right vocal cord is also noted, nonspecific but perhaps hypertrophic or edematous post-RT and without suspicious enhancement.\par \par He last followed up wit Dr. Bariros on 3/4/2020; plan was to follow up again in November.  He has not seen cardiology re: coronary artery calcifications.  He last saw dental in June; tooth was extracted. He is scheduled to see the dentist this afternoon for a routine cleaning.  \par \par Today, he notes he feels generally well. He notes fatigue at times, but energy is "generally good." He is able to eat a regular diet and his taste is 100%. He reports he forgets to do H&N exercises when he is "feeling good," but remembers when he has trouble with mouth ROM. He also reports xerostomia at times, only at night. He keeps water at the bedside. He also notes he has not been using Prevident and request a prescription to be sent to his pharmacy. Of note, he tore his right Achilles in February while playing basketball and is s/p repair on 2/26/2020. He otherwise feels well and denies CP, SOB, cough, dysphagia, odynophagia, fever, chills, or other complaints. 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 He does still experience acid reflux; has a GI doc and has been prescribed PPIs but prefers natural/diet-related remedies.\par \par \par 11/7/19 - Follow-up\par Mr. Martinez returns today for routine follow-up.  He was last seen on 4/18/19.  Plan was for CT neck in 6 months, follow-up with Dr. Barrios in July, neurology for L'Hermittes, dental follow-up in May or after, see cardiology for follow-up of coronary calcifications seen on imaging, continue OT/lymphedema therapy, continue Biotene and PreviDent, try Salivamax, daily head and neck exercises.\par \par CT neck with contrast 10/18/19 - No evidence of residual or recurrent right palatine tonsil mass.  No cervical lymphadenopathy.  \par \par He saw Dr. Alvarado for evaluation of L'Hermittes and underwent MRI of the thoracic and cervical spine.  Plan is for no further workup and PRN follow-up. \par \par MRI cervical spine with and without contrast 6/10/19 - IMPRESSION:\par 1.  No MR evidence of compressive or primary cervical myelopathy. \par 2.  No signficant spinal stenosis; minor spondylosis at C5-C6 and C6-7 with neural foraminal narrowing on the left at the former.  \par 3.  Radiation sequela is present in the prevertebral soft tissues and bone marrow. \par \par MRI thoracic spine with and without contrast 6/10/19 - IMPRESSION: No evidence of compressive or primary thoracic myelopathy.  Minimal spondylosis as above but otherwise unremarkable study. \par \par He last saw Dr. Barrios on 7/22/19.  He last saw dental a few weeks ago and was recommended extraction of his right upper wisdom tooth.  He has not seen a cardiologist and would like a referral.  Saw his PMD for a routine physical yesterday.  Today, he reports feeling generally well.  He notes continue fatigued that "ebbs and flows."  He also notes unchanged xerostomia.  He denies dysphagia and odynophagia, states he is eating a regular diet.  He completed lymphedema therapy and continues to do exercises at home.  He recently stopped his zantac and is now taking a probiotic, digestive enzyme and licorice for reflux with relief of symptoms.  He is using Biotene, PreviDent and doing head and neck exercises.  He denies smoking, admits to 1-2 drinks every few months.  \par \par 4/18/19 - He returns today for routine follow-up.  He was last seen here on 1/2/19.  At that time, he was reporting fatigue, dry mouth, slight throat pain controlled with gabapentin and dysgeusia.  Plan was for PET/CT in 2 months and f/u with Dr. Barrios and PMD.  He Last saw Dr. Barrios on 2/25/19; DENISE on clinical exam.  Also saw SLP that day, advised to continue regular diet with thin liquids and head/neck exercises.  Last saw PMD in October.  He states he is due for routine visit with PMD and will schedule soon.   \par \par He will see Dr. Shmuel Alvarado in May for workup of L'Hermitte's which happens frequently. \par \par PET/CT 2/25/19 Impression: \par Since 9/12/2018, there has been a decrease in the size and FDG activity of the mass in the right tonsil, now with only minimal activity remaining within the edematous soft tissue at that site.  This likely represents postradiation change.  \par \par Today he reports feeling well overall.  He reports continued dry mouth, which occasionally makes swallowing difficult.  He is using Biotene mouth rinses, humidifier, taking liquids with meals, and sugar-free hard candy with relief.  He is tolerating a regular diet.  He is seeing OT biweekly for lymphedema therapy and doing head and neck exercises regularly.  He reports his taste is 65-70%.  He also reports continued fatigue, which is slowly improving.  \par \par Oncologic History\par Mr. 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 He was referred to Dr. Laguna (ENT) who recommended a MRI. MRI neck 8/15/18 showed a 3.7 x 2.4 x 2.2 cm enhancing lobular mass at the right palatine tonsil with inferior extension to the glossotonsillar sulcus.\par \par He consulted with  Dr. Galicia (ENT) who referred him to Dr. Carey and was then referred to Dr. Barrios for Right tonsillar lesion which was clinically suspicious for cancer. \par \par He underwent a DL/biopsy by Dr. Barrios 9/4/18 which showed exophytic right tonsil tumor extending into soft palate adjacent to uvula, involving entire right tonsil bed and lateral pharyngeal wall and extending into the Rt GTS.  Right tonsillar biopsy showed invasive squamous cell carcinoma, moderately differentiated and strongly and diffusely positive for p16.\par \par PET/CT 9/12/18 showed a right palatine tonsil mass measuring 3.7 x 2.4 x 2.2 cm which shows abnormal FDG activity with SUV max 11.9. There was 2 tiny nodules in the right middle lobe with FDG activity and a tiny focus of FDG activity in the liver. MRI abd 9/20/18- no liver lesions identified. \par \par His case was presented at tumor board with  consensus is for CRT. 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 He was referred to Shain[facility] Munoz and [facility].\par \par Today he notes pain left side of tongue that has improved over past few days. Not sure if it is post anesthesia injection and has slight abdominal discomfort. Denies throat pain or dysphagia. Notes 25 lb wt loss over past 5 lbs due to "lifestyle changes" including not drinking alcohol. Today he has an appt. to see Dr. Cuevas.\par

## 2020-07-30 NOTE — REVIEW OF SYSTEMS
[Fatigue] : fatigue [Negative] : Gastrointestinal [Dysphagia: Grade 0] : Dysphagia: Grade 0 [Fatigue: Grade 1 - Fatigue relieved by rest] : Fatigue: Grade 1 - Fatigue relieved by rest [Mucositis Oral: Grade 0] : Mucositis Oral: Grade 0  [Oral Pain: Grade 0] : Oral Pain: Grade 0 [Xerostomia: Grade 1 - Symptomatic (e.g., dry or thick saliva) without significant dietary alteration; unstimulated saliva flow >0.2 ml/min] : Xerostomia: Grade 1 - Symptomatic (e.g., dry or thick saliva) without significant dietary alteration; unstimulated saliva flow >0.2 ml/min [Salivary duct inflammation: Grade 1 - Slightly thickened saliva; slightly altered taste (e.g., metallic)] : Salivary duct inflammation: Grade 1 - Slightly thickened saliva; slightly altered taste (e.g., metallic) [Hoarseness] : no hoarseness [FreeTextEntry4] : see HPI

## 2020-07-30 NOTE — DISEASE MANAGEMENT
[Pathological] : TNM Stage: p [I] : I [FreeTextEntry4] : p16 positive right tonsil cancer [TTNM] : 2 [NTNM] : 0 [de-identified] : oropharynx/ neck [MTNM] : 0

## 2020-07-30 NOTE — PHYSICAL EXAM
[General Appearance - Alert] : alert [General Appearance - In No Acute Distress] : in no acute distress [Examination Of The Oral Cavity] : the lips and gums were normal [Sclera] : the sclera and conjunctiva were normal [Oropharynx] : The oropharynx was normal [Normal Oral Cavity] : oral cavity was normal [Normal] : no respiratory distress, lungs were clear to auscultation bilaterally [Murmurs] : no murmurs present [Heart Sounds] : normal S1 and S2 [Skin Color & Pigmentation] : normal skin color and pigmentation [Abdomen Soft] : soft [No Focal Deficits] : no focal deficits [Oriented To Time, Place, And Person] : oriented to person, place, and time [Extraocular Movements] : extraocular movements were intact [Abdomen Tenderness] : non-tender [de-identified] : mild fibrosis right neck  [de-identified] : good saliva, DENISE. s/p extraction of upper posterior molar tooth.  [de-identified] : no palpable cervical or supraclavicular lymphadenopathy [de-identified] : wearing a brace= RLE

## 2020-07-30 NOTE — PROCEDURE
[Topical Lidocaine] : topical lidocaine [Flexible Endoscope] : examined with the flexible endoscope [Photographs Taken] : photographs taken [Normal] : normal base of the tongue [de-identified] : Sub-glottic tracheal lesion seen on anterior wall; whitish and smooth borders.

## 2020-08-03 LAB — TSH SERPL-ACNC: 2.08 UIU/ML

## 2020-08-05 ENCOUNTER — APPOINTMENT (OUTPATIENT)
Dept: OTOLARYNGOLOGY | Facility: CLINIC | Age: 60
End: 2020-08-05
Payer: COMMERCIAL

## 2020-08-05 VITALS
TEMPERATURE: 97.5 F | HEIGHT: 71 IN | HEART RATE: 60 BPM | DIASTOLIC BLOOD PRESSURE: 87 MMHG | SYSTOLIC BLOOD PRESSURE: 128 MMHG | BODY MASS INDEX: 26.32 KG/M2 | WEIGHT: 188 LBS

## 2020-08-05 PROCEDURE — 31575 DIAGNOSTIC LARYNGOSCOPY: CPT

## 2020-08-05 PROCEDURE — 99214 OFFICE O/P EST MOD 30 MIN: CPT | Mod: 25

## 2020-08-06 ENCOUNTER — APPOINTMENT (OUTPATIENT)
Dept: RADIOLOGY | Facility: CLINIC | Age: 60
End: 2020-08-06

## 2020-08-06 ENCOUNTER — APPOINTMENT (OUTPATIENT)
Dept: HEART AND VASCULAR | Facility: CLINIC | Age: 60
End: 2020-08-06
Payer: COMMERCIAL

## 2020-08-06 ENCOUNTER — OUTPATIENT (OUTPATIENT)
Dept: OUTPATIENT SERVICES | Facility: HOSPITAL | Age: 60
LOS: 1 days | End: 2020-08-06
Payer: COMMERCIAL

## 2020-08-06 ENCOUNTER — NON-APPOINTMENT (OUTPATIENT)
Age: 60
End: 2020-08-06

## 2020-08-06 ENCOUNTER — LABORATORY RESULT (OUTPATIENT)
Age: 60
End: 2020-08-06

## 2020-08-06 VITALS
HEIGHT: 71 IN | DIASTOLIC BLOOD PRESSURE: 68 MMHG | BODY MASS INDEX: 26.04 KG/M2 | HEART RATE: 66 BPM | SYSTOLIC BLOOD PRESSURE: 130 MMHG | WEIGHT: 186 LBS

## 2020-08-06 DIAGNOSIS — Z01.818 ENCOUNTER FOR OTHER PREPROCEDURAL EXAMINATION: ICD-10-CM

## 2020-08-06 DIAGNOSIS — Z98.890 OTHER SPECIFIED POSTPROCEDURAL STATES: Chronic | ICD-10-CM

## 2020-08-06 DIAGNOSIS — Z41.9 ENCOUNTER FOR PROCEDURE FOR PURPOSES OTHER THAN REMEDYING HEALTH STATE, UNSPECIFIED: Chronic | ICD-10-CM

## 2020-08-06 PROCEDURE — 93000 ELECTROCARDIOGRAM COMPLETE: CPT

## 2020-08-06 PROCEDURE — 71046 X-RAY EXAM CHEST 2 VIEWS: CPT | Mod: 26

## 2020-08-06 PROCEDURE — 99244 OFF/OP CNSLTJ NEW/EST MOD 40: CPT

## 2020-08-06 NOTE — REVIEW OF SYSTEMS
[Joint Pain] : joint pain [Negative] : Endocrine [Fever] : no fever [Recent Weight Gain (___ Lbs)] : no recent weight gain [Headache] : no headache [Feeling Fatigued] : not feeling fatigued [Chills] : no chills [Eyeglasses] : not currently wearing eyeglasses [Blurry Vision] : no blurred vision [Recent Weight Loss (___ Lbs)] : no recent weight loss [Shortness Of Breath] : no shortness of breath [Earache] : no earache [Mouth Sores] : no mouth sores [Dyspnea on exertion] : not dyspnea during exertion [Chest  Pressure] : no chest pressure [Leg Claudication] : no intermittent leg claudication [Lower Ext Edema] : no extremity edema [Chest Pain] : no chest pain [Palpitations] : no palpitations [Cough] : no cough [Abdominal Pain] : no abdominal pain [Dysphagia] : no dysphagia [Heartburn] : no heartburn [Urinary Frequency] : no change in urinary frequency [Dizziness] : no dizziness [Muscle Cramps] : no muscle cramps [Hematuria] : no hematuria [Convulsions] : no convulsions [Tremor] : no tremor was seen

## 2020-08-06 NOTE — HISTORY OF PRESENT ILLNESS
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 The pt denies any hx of chest pain on exertion, SOB on exertion or leg swelling. He denies any hx of HTN, HLD or DM. He is not sure if he was tested for HbA1c or lipid panel in the past. He is currently not taking any ASA or Statin. He denies any hx of premature heart disease or sudden cardiac death. He states he is not physically lately due to injury, but was prior to February. He has been walking and exercising regularly prior to that without significant symptoms, states he eats a healthy diet which include plant based protein. He denies smoking or drinking; reports hx of remote marijuana use. He follows with a PCP at [facility]. \par \par His last CT scan done on 7/28/2020 showed a small soft tissue mass in cervical trachea and he is scheduled to get biopsy on 8/20/2020.

## 2020-08-06 NOTE — DISCUSSION/SUMMARY
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 Sent today to help guide dosing. \par Although he is currently not as active as he had been, he has fair exercise capacity to undergo upcoming low risk procedure. He can go to surgery on statin alone and start aspirin after surgery. \par Will consider a stress test in the future- either when healed or a dobutamine, but no need to conduct now as he has more than adequate exercise capacity for a low risk procedure. \par He is already leading a healthy lifestyle and encouraged to continue.

## 2020-08-07 LAB
25(OH)D3 SERPL-MCNC: 17.5 NG/ML
ALBUMIN SERPL ELPH-MCNC: 4.8 G/DL
ALP BLD-CCNC: 90 U/L
ALT SERPL-CCNC: 12 U/L
ANION GAP SERPL CALC-SCNC: 11 MMOL/L
AST SERPL-CCNC: 12 U/L
BASOPHILS # BLD AUTO: 0.05 K/UL
BASOPHILS NFR BLD AUTO: 1.6 %
BILIRUB SERPL-MCNC: 0.7 MG/DL
BUN SERPL-MCNC: 10 MG/DL
CALCIUM SERPL-MCNC: 10 MG/DL
CHLORIDE SERPL-SCNC: 104 MMOL/L
CHOLEST SERPL-MCNC: 190 MG/DL
CHOLEST/HDLC SERPL: 2.7 RATIO
CO2 SERPL-SCNC: 27 MMOL/L
CREAT SERPL-MCNC: 0.84 MG/DL
EOSINOPHIL # BLD AUTO: 0.07 K/UL
EOSINOPHIL NFR BLD AUTO: 2.3 %
ESTIMATED AVERAGE GLUCOSE: 103 MG/DL
GLUCOSE SERPL-MCNC: 96 MG/DL
HBA1C MFR BLD HPLC: 5.2 %
HCT VFR BLD CALC: 41.2 %
HDLC SERPL-MCNC: 70 MG/DL
HGB BLD-MCNC: 13.5 G/DL
IMM GRANULOCYTES NFR BLD AUTO: 0.3 %
LDLC SERPL CALC-MCNC: 109 MG/DL
LYMPHOCYTES # BLD AUTO: 0.46 K/UL
LYMPHOCYTES NFR BLD AUTO: 14.9 %
MAN DIFF?: NORMAL
MCHC RBC-ENTMCNC: 30.3 PG
MCHC RBC-ENTMCNC: 32.8 GM/DL
MCV RBC AUTO: 92.4 FL
MONOCYTES # BLD AUTO: 0.3 K/UL
MONOCYTES NFR BLD AUTO: 9.7 %
NEUTROPHILS # BLD AUTO: 2.19 K/UL
NEUTROPHILS NFR BLD AUTO: 71.2 %
PLATELET # BLD AUTO: 209 K/UL
POTASSIUM SERPL-SCNC: 4.9 MMOL/L
PROT SERPL-MCNC: 7.2 G/DL
RBC # BLD: 4.46 M/UL
RBC # FLD: 12.5 %
SODIUM SERPL-SCNC: 143 MMOL/L
TRIGL SERPL-MCNC: 56 MG/DL
WBC # FLD AUTO: 3.08 K/UL

## 2020-08-07 NOTE — HISTORY OF PRESENT ILLNESS
[de-identified] : 60M with [id number] p16+ right tonsil SCC treated with definitive EBRT with 6996 cGy to the oropharynx/neck from 10/11/18 - 11/27/18. He tolerated treatment without unexpected complications. \par \par He ruptured his R Achilles tendon playing basketball s/p repair 2/26/20. Pt reports no trismus, good diet and swallowing, weight was stable until his recent injury. No coughing, no choking, no dyspnea. He continues to have mild xerostomia which can affect deeper pitches, which he alleviates by drinking liquids. He has altered taste, sometime has a salty/bitter aftertaste. Reports adequate fluid intake. He has right neck tightness, continues to perform home neck exercises. He reports that the Lhmichellee's syndome has improved/resolved.\par \par 10/18/19 CT neck DENISE.  \par \par 7/28/20 CT neck: No evidence of recurrent oropharyngeal mass or cervical lymphadenopathy.  However, a new subcm polypoid focus of soft tissue density is identified along the right anterior wall of the cervical trachea, approximately 3 cm below the level of the cords, This is indeterminate for a polyp or mass, or possibly dense mucoid debris although it lies anteriorly rather than posteriorly. Mild asymmetric fullness of the right vocal cord is also noted, nonspecific abut perhaps hypertropic or edematous post-RT and without suspicious enhancement.\par \par 8/5/2020 He presents for follow-up exam for an abnormal finding on his recent CT neck.  He is otherwise feeling well, no dysphonia, no dyspnea, no dysphagia.  He is wearing a leg splint.  Florencio's syndome has resolved. His wife is recording the visit.

## 2020-08-07 NOTE — DATA REVIEWED
[de-identified] : EXAM:  CT NECK SOFT TISSUE IC\par \par PROCEDURE DATE:  07/28/2020\par \par \par \par \par INTERPRETATION:  Technique: A thin section axial acquisition was performed from the skull base to the thoracic inlet.  The data was reformatted in the sagittal, coronal and axial planes.\par \par Contrast: 100 cc Omnipaque 350\par \par Clinical Information: Surveillance imaging status post treatment of right tonsil cancer\par \par Prior Studies: 10/18/2019\par \par Findings:\par \par *  Aerodigestive Tract: There is approximately stable appearance of the treated oropharynx without suspicious nodular or masslike enhancement to indicate recurrence at the primary site.\par \par Elsewhere along the aerodigestive tract, there is mild asymmetric fullness of the right vocal cord (axial image 74) without asymmetric or suspicious nodular enhancement. This could reflect radiation effect or potential hypertrophy. On coronal series 601, image 39, this appears less suspicious.\par \par In the proximal trachea, approximately 3 cm below the cords, there is a new sessile appearing nodular density along the right anterior wall of the trachea measuring approximately 7 mm in width, 6 mm in height, and 4 mm in depth (series 2, image 89; series 602, image 41). This shows intermediate density and is questionable for a polyp or mass as opposed to retained secretions given its anti-dependent location and density higher than expected for mucoid material. No overt enhancement or invasive appearance. Direct inspection is suggested, or at least short interval follow-up at 3 months.\par \par *  Lymph Nodes: There are no pathologically enlarged lymph nodes on either side of the neck.\par \par *  Salivary Glands: No focal lesion. There is similar mild posttreatment atrophy of the right submandibular gland.\par \par *  Thyroid Gland: Unremarkable\par \par *  Orbits: No abnormal soft tissue or mass effect.\par \par *  Brain: Unremarkable\par \par *  Skull Base: Unremarkable\par \par *  Paranasal Sinuses: Unremarkable\par \par *  Mastoids: Unremarkable\par \par *  Cervical Spine: Similar to before, there is mild disc degenerative change at the C5-6 level\par \par *  Lung Apices: Unremarkable\par \par \par IMPRESSION:\par \par No evidence of recurrent oropharyngeal mass or cervical lymphadenopathy.\par \par However, a new subcentimeter polypoid focus of soft tissue density is identified along the right anterior wall of the cervical trachea, approximately 3 cm below the level of the cords. This is indeterminate for a polyp or mass, or possibly dense mucoid debris although it lies anteriorly rather than posteriorly. Direct inspection is suggested, or at least short interval follow-up. Mild asymmetric fullness of the right vocal cord is also noted, nonspecific but perhaps hypertrophic or edematous post-RT and without suspicious enhancement.\par \par JELANI GREER M.D. ATTENDING RADIOLOGIST\par This document has been electronically signed. Jul 28 2020  3:17PM\par

## 2020-08-07 NOTE — REASON FOR VISIT
[Subsequent Evaluation] : a subsequent evaluation for [Spouse] : spouse [FreeTextEntry2] : T2N0 HPV+ R tonsil SCC, abnormal finding on imaging

## 2020-08-07 NOTE — PROCEDURE
[Image(s) Captured] : image(s) captured and filed [Unable to Cooperate with Mirror] : patient unable to cooperate with mirror [Flexible Endoscope] : examined with the flexible endoscope [Lesion] : lesion identified by mirror examination needing further evaluation [None] : none [de-identified] : Flexible fiberoptic laryngoscope advanced orally, no oropharyngeal lesions or masses identified, larynx visualized, adequate and symmetric cord adduction and abduction noted, vocal cords appear symmetric, no fullness noted.  There is a subglottic anterior tracheal lesion visualized, whitish, flat, and smooth.\par

## 2020-08-07 NOTE — PHYSICAL EXAM
[Laryngoscopy Performed] : laryngoscopy was performed, see procedure section for findings [Normal] : no rashes [de-identified] : no lymphedema noted [de-identified] : R tonsil DENISE, no masses or lesions

## 2020-08-07 NOTE — ASSESSMENT
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 No evidence of OPSCC or manoj recurrence, however there is an indeterminate subglottic anterior tracheal wall lesion, unlikely malignant.\par \par - DL with biopsy.\par - PST given.\par - F/u after biopsy.\par - Pt verbalized understanding of above.

## 2020-08-17 ENCOUNTER — APPOINTMENT (OUTPATIENT)
Dept: DISASTER EMERGENCY | Facility: CLINIC | Age: 60
End: 2020-08-17

## 2020-08-17 DIAGNOSIS — Z01.818 ENCOUNTER FOR OTHER PREPROCEDURAL EXAMINATION: ICD-10-CM

## 2020-08-18 LAB — SARS-COV-2 N GENE NPH QL NAA+PROBE: NOT DETECTED

## 2020-08-19 ENCOUNTER — TRANSCRIPTION ENCOUNTER (OUTPATIENT)
Age: 60
End: 2020-08-19

## 2020-08-19 VITALS
HEART RATE: 64 BPM | WEIGHT: 191.8 LBS | DIASTOLIC BLOOD PRESSURE: 85 MMHG | OXYGEN SATURATION: 99 % | RESPIRATION RATE: 16 BRPM | TEMPERATURE: 97 F | HEIGHT: 71 IN | SYSTOLIC BLOOD PRESSURE: 137 MMHG

## 2020-08-19 NOTE — ASU PATIENT PROFILE, ADULT - PMH
Anxiety    Cancer of oropharynx  s/p RT  Diverticulosis    Gastritis    Hiatal hernia    Tonsillar mass

## 2020-08-19 NOTE — ASU PATIENT PROFILE, ADULT - PSH
Elective surgery  anal cyst removed  H/O Achilles tendon repair    History of hip surgery  left, debridement 2008

## 2020-08-19 NOTE — ASU PATIENT PROFILE, ADULT - PRO MENTAL HEALTH SX RECENT
Patient: Michael Saldivar    Procedure(s):  Esophogastroduodenoscopy, PEG-TUBE INSERTION, Left THORACOTOMY, Chest tube insertion - Wound Class: II-Clean Contaminated  COMPINED ESOPHAGOSCOPY, GASTROSCOPY, DUODENOSCOPY, PEG-TUBE INSERTION, RIGHT CHEST TUBE INSERTION, POSSIBLE NASOGASTRIC TUBE, POSSIBLE THORACOTOMY.  - Wound Class: I-Clean  COMPINED ESOPHAGOSCOPY, GASTROSCOPY, DUODENOSCOPY, PEG-TUBE INSERTION, RIGHT CHEST TUBE INSERTION, POSSIBLE NASOGASTRIC TUBE, POSSIBLE THORACOTOMY.  - Wound Class: II-Clean Contaminated    Diagnosis:esophageal perforation   Diagnosis Additional Information: No value filed.    Anesthesia Type:  General, RSI, ETT    Note:  Anesthesia Post Evaluation    Patient location during evaluation: PACU  Patient participation: Able to fully participate in evaluation  Level of consciousness: awake and alert  Pain management: adequate  Airway patency: patent  Cardiovascular status: acceptable and hemodynamically stable  Respiratory status: acceptable  Hydration status: acceptable  PONV: none     Anesthetic complications: None          Last vitals:  Vitals:    03/19/18 0200 03/19/18 0300 03/19/18 0400   BP: 123/83 139/88 126/68   Resp: 10 15 15   Temp:   36.6  C (97.8  F)   SpO2: 97% 96% 98%         Electronically Signed By: Sancho Oreilly MD  March 19, 2018  5:51 AM   none

## 2020-08-20 ENCOUNTER — APPOINTMENT (OUTPATIENT)
Dept: OTOLARYNGOLOGY | Facility: HOSPITAL | Age: 60
End: 2020-08-20

## 2020-08-20 ENCOUNTER — OUTPATIENT (OUTPATIENT)
Dept: INPATIENT UNIT | Facility: HOSPITAL | Age: 60
LOS: 1 days | Discharge: HOME | End: 2020-08-20
Payer: COMMERCIAL

## 2020-08-20 VITALS
SYSTOLIC BLOOD PRESSURE: 115 MMHG | DIASTOLIC BLOOD PRESSURE: 74 MMHG | OXYGEN SATURATION: 99 % | RESPIRATION RATE: 14 BRPM | HEART RATE: 71 BPM

## 2020-08-20 DIAGNOSIS — Z98.890 OTHER SPECIFIED POSTPROCEDURAL STATES: Chronic | ICD-10-CM

## 2020-08-20 DIAGNOSIS — Z41.9 ENCOUNTER FOR PROCEDURE FOR PURPOSES OTHER THAN REMEDYING HEALTH STATE, UNSPECIFIED: Chronic | ICD-10-CM

## 2020-08-20 PROCEDURE — 31536 LARYNGOSCOPY W/BX & OP SCOPE: CPT | Mod: 53,GC

## 2020-08-20 PROCEDURE — 31536 LARYNGOSCOPY W/BX & OP SCOPE: CPT | Mod: 74

## 2020-08-20 RX ORDER — SODIUM CHLORIDE 9 MG/ML
1000 INJECTION, SOLUTION INTRAVENOUS
Refills: 0 | Status: DISCONTINUED | OUTPATIENT
Start: 2020-08-20 | End: 2020-08-20

## 2020-08-20 RX ORDER — ACETAMINOPHEN 500 MG
650 TABLET ORAL EVERY 6 HOURS
Refills: 0 | Status: DISCONTINUED | OUTPATIENT
Start: 2020-08-20 | End: 2020-08-20

## 2020-08-20 RX ORDER — IBUPROFEN 200 MG
400 TABLET ORAL EVERY 6 HOURS
Refills: 0 | Status: DISCONTINUED | OUTPATIENT
Start: 2020-08-20 | End: 2020-08-20

## 2020-08-20 NOTE — BRIEF OPERATIVE NOTE - OPERATION/FINDINGS
False cords and TVCs wnl.  area of concern of anterior subglottis palpated and found to be buckling of cricoid shelf.

## 2020-08-20 NOTE — PACU DISCHARGE NOTE - COMMENTS
discharged instructions provided, discharged via wheelchair to the lobby escorted home by girlfriend

## 2020-08-21 PROBLEM — C10.9 MALIGNANT NEOPLASM OF OROPHARYNX, UNSPECIFIED: Chronic | Status: ACTIVE | Noted: 2020-08-19

## 2020-08-21 PROBLEM — F41.9 ANXIETY DISORDER, UNSPECIFIED: Chronic | Status: ACTIVE | Noted: 2020-08-19

## 2020-08-26 ENCOUNTER — APPOINTMENT (OUTPATIENT)
Dept: OTOLARYNGOLOGY | Facility: CLINIC | Age: 60
End: 2020-08-26
Payer: COMMERCIAL

## 2020-08-26 DIAGNOSIS — J39.8 OTHER SPECIFIED DISEASES OF UPPER RESPIRATORY TRACT: ICD-10-CM

## 2020-08-26 DIAGNOSIS — Z09 ENCOUNTER FOR FOLLOW-UP EXAMINATION AFTER COMPLETED TREATMENT FOR CONDITIONS OTHER THAN MALIGNANT NEOPLASM: ICD-10-CM

## 2020-08-26 PROCEDURE — 99213 OFFICE O/P EST LOW 20 MIN: CPT

## 2020-08-27 PROBLEM — J39.8: Status: ACTIVE | Noted: 2020-07-30

## 2020-08-27 PROBLEM — Z09 POSTOP CHECK: Status: ACTIVE | Noted: 2020-08-26

## 2020-08-27 NOTE — DATA REVIEWED
[de-identified] : EXAM:  CT NECK SOFT TISSUE IC\par \par PROCEDURE DATE:  07/28/2020\par \par \par \par \par INTERPRETATION:  Technique: A thin section axial acquisition was performed from the skull base to the thoracic inlet.  The data was reformatted in the sagittal, coronal and axial planes.\par \par Contrast: 100 cc Omnipaque 350\par \par Clinical Information: Surveillance imaging status post treatment of right tonsil cancer\par \par Prior Studies: 10/18/2019\par \par Findings:\par \par *  Aerodigestive Tract: There is approximately stable appearance of the treated oropharynx without suspicious nodular or masslike enhancement to indicate recurrence at the primary site.\par \par Elsewhere along the aerodigestive tract, there is mild asymmetric fullness of the right vocal cord (axial image 74) without asymmetric or suspicious nodular enhancement. This could reflect radiation effect or potential hypertrophy. On coronal series 601, image 39, this appears less suspicious.\par \par In the proximal trachea, approximately 3 cm below the cords, there is a new sessile appearing nodular density along the right anterior wall of the trachea measuring approximately 7 mm in width, 6 mm in height, and 4 mm in depth (series 2, image 89; series 602, image 41). This shows intermediate density and is questionable for a polyp or mass as opposed to retained secretions given its anti-dependent location and density higher than expected for mucoid material. No overt enhancement or invasive appearance. Direct inspection is suggested, or at least short interval follow-up at 3 months.\par \par *  Lymph Nodes: There are no pathologically enlarged lymph nodes on either side of the neck.\par \par *  Salivary Glands: No focal lesion. There is similar mild posttreatment atrophy of the right submandibular gland.\par \par *  Thyroid Gland: Unremarkable\par \par *  Orbits: No abnormal soft tissue or mass effect.\par \par *  Brain: Unremarkable\par \par *  Skull Base: Unremarkable\par \par *  Paranasal Sinuses: Unremarkable\par \par *  Mastoids: Unremarkable\par \par *  Cervical Spine: Similar to before, there is mild disc degenerative change at the C5-6 level\par \par *  Lung Apices: Unremarkable\par \par \par IMPRESSION:\par \par No evidence of recurrent oropharyngeal mass or cervical lymphadenopathy.\par \par However, a new subcentimeter polypoid focus of soft tissue density is identified along the right anterior wall of the cervical trachea, approximately 3 cm below the level of the cords. This is indeterminate for a polyp or mass, or possibly dense mucoid debris although it lies anteriorly rather than posteriorly. Direct inspection is suggested, or at least short interval follow-up. Mild asymmetric fullness of the right vocal cord is also noted, nonspecific but perhaps hypertrophic or edematous post-RT and without suspicious enhancement.\par \par JELANI GREER M.D. ATTENDING RADIOLOGIST\par This document has been electronically signed. Jul 28 2020  3:17PM\par

## 2020-08-27 NOTE — REASON FOR VISIT
[Subsequent Evaluation] : a subsequent evaluation for [Spouse] : spouse [FreeTextEntry2] : post op DL f/u

## 2020-08-27 NOTE — ASSESSMENT
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 No evidence of OPSCC or manoj recurrence, on 08/20/20 underwent DL for evaluation of indeterminate subglottic anterior tracheal wall lesion- the subglottic lesion appeared as anatomic variant of the cricoid cartilage, no evidence of malignancy. \par \par plan: \par \par - f/u visit in 3 months.\par - continue f/u with Tecrey.\par - if any concerns/questions arise, please contact our clinic. \par - Pt verbalized understanding of above.

## 2020-10-08 NOTE — PROCEDURE
[Topical Lidocaine] : topical lidocaine [Flexible Endoscope] : examined with the flexible endoscope [Photographs Taken] : photographs taken [Normal] : normal [de-identified] : Sub-glottic tracheal lesion seen on anterior wall; whitish and smooth borders.

## 2020-10-08 NOTE — REVIEW OF SYSTEMS
[Fatigue] : fatigue [Hoarseness] : no hoarseness [Negative] : Heme/Lymph [FreeTextEntry4] : see HPI [Dysphagia: Grade 0] : Dysphagia: Grade 0 [Fatigue: Grade 1 - Fatigue relieved by rest] : Fatigue: Grade 1 - Fatigue relieved by rest [Mucositis Oral: Grade 0] : Mucositis Oral: Grade 0  [Xerostomia: Grade 1 - Symptomatic (e.g., dry or thick saliva) without significant dietary alteration; unstimulated saliva flow >0.2 ml/min] : Xerostomia: Grade 1 - Symptomatic (e.g., dry or thick saliva) without significant dietary alteration; unstimulated saliva flow >0.2 ml/min [Oral Pain: Grade 0] : Oral Pain: Grade 0 [Salivary duct inflammation: Grade 1 - Slightly thickened saliva; slightly altered taste (e.g., metallic)] : Salivary duct inflammation: Grade 1 - Slightly thickened saliva; slightly altered taste (e.g., metallic)

## 2020-10-08 NOTE — HISTORY OF PRESENT ILLNESS
[FreeTextEntry1] : Mr. 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 He tolerated treatment without unexpected complications.\par \par 10/15/2020- Follow-up\par Mr. Martinez returns for routine follow up. When he was last seen on 7/30/2020, he was advised on GERD management and importance of continued self care (Prevident, Biotene, H&N exercises), referred to cardiology for coronary artery calcification seen on imaging. Plan also included checking TSH and follow up with Dr. Barrios and dentist. TSH checked 7/30/2020 was WNL- 2.08. He followed up with Dr. Barrios on 8/5/2020; plan was for EUA and biopsy of the subglottic anterior tracheal wall lesion. He established care with cardiologist Dr. Guzman on 8/6/2020; plan was to start Atorvastatin, start ASA after surgery with Dr. Barrios, and consider a stress test in the future. Blood work also done, which was notable for slightly elevated LDL cholesterol of 109. He last followed up with dental __. \par \par On 8/20/2020, he underwent EUA, at which time was noted that the subglottic shelf was actually an anatomic variant of buckling between the cricoid cartilage and the larynx. Therefore, biopsy was not required. He then followed up with Dr. Barrios on 8/26/2020; plan was to follow up in 3 months (scheduled for 12/2/2020).\par \par Today, he XX\par \par \par 7/30/20- Follow-up\par Mr. Martinez returns for routine follow-up.  He was last seen on 11/7/19.  Plan was for CT neck and f/u in 8 months, f/u with Dr. Barrios in 4 months, continue PreviDent, Biotene, and head and neck exercises, referral to cardiology at [facility].  Dental recommendations were also discussed - no extraction for tooth #1 which received between 60-66 Gy.  \par \par CT neck with contrast 7/28/20 \par - No evidence of recurrent oropharyngeal mass or cervical lymphadenopathy.\par - However, a new subcentimeter polypoid focus of soft tissue density is identified along the right anterior wall of the cervical trachea, approximately 3 cm below the level of the cords. This is indeterminate for a polyp or mass, or possibly dense mucoid debris although it lies anteriorly rather than posteriorly. Direct inspection is suggested, or at least short interval follow-up. Mild asymmetric fullness of the right vocal cord is also noted, nonspecific but perhaps hypertrophic or edematous post-RT and without suspicious enhancement.\par \par He last followed up wit Dr. Barrios on 3/4/2020; plan was to follow up again in November.  He has not seen cardiology re: coronary artery calcifications.  He last saw dental in June; tooth was extracted. He is scheduled to see the dentist this afternoon for a routine cleaning.  \par \par Today, he notes he feels generally well. He notes fatigue at times, but energy is "generally good." He is able to eat a regular diet and his taste is 100%. He reports he forgets to do H&N exercises when he is "feeling good," but remembers when he has trouble with mouth ROM. He also reports xerostomia at times, only at night. He keeps water at the bedside. He also notes he has not been using Prevident and request a prescription to be sent to his pharmacy. Of note, he tore his right Achilles in February while playing basketball and is s/p repair on 2/26/2020. He otherwise feels well and denies CP, SOB, cough, dysphagia, odynophagia, fever, chills, or other complaints. 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 He does still experience acid reflux; has a GI doc and has been prescribed PPIs but prefers natural/diet-related remedies.\par \par \par 11/7/19 - Follow-up\ron Martinez returns today for routine follow-up.  He was last seen on 4/18/19.  Plan was for CT neck in 6 months, follow-up with Dr. Barrios in July, neurology for L'Hermittes, dental follow-up in May or after, see cardiology for follow-up of coronary calcifications seen on imaging, continue OT/lymphedema therapy, continue Biotene and PreviDent, try Salivamax, daily head and neck exercises.\par \par CT neck with contrast 10/18/19 - No evidence of residual or recurrent right palatine tonsil mass.  No cervical lymphadenopathy.  \par \par He saw Dr. Alvarado for evaluation of L'Hermittes and underwent MRI of the thoracic and cervical spine.  Plan is for no further workup and PRN follow-up. \par \par MRI cervical spine with and without contrast 6/10/19 - IMPRESSION:\par 1.  No MR evidence of compressive or primary cervical myelopathy. \par 2.  No signficant spinal stenosis; minor spondylosis at C5-C6 and C6-7 with neural foraminal narrowing on the left at the former.  \par 3.  Radiation sequela is present in the prevertebral soft tissues and bone marrow. \par \par MRI thoracic spine with and without contrast 6/10/19 - IMPRESSION: No evidence of compressive or primary thoracic myelopathy.  Minimal spondylosis as above but otherwise unremarkable study. \par \par He last saw Dr. Barrios on 7/22/19.  He last saw dental a few weeks ago and was recommended extraction of his right upper wisdom tooth.  He has not seen a cardiologist and would like a referral.  Saw his PMD for a routine physical yesterday.  Today, he reports feeling generally well.  He notes continue fatigued that "ebbs and flows."  He also notes unchanged xerostomia.  He denies dysphagia and odynophagia, states he is eating a regular diet.  He completed lymphedema therapy and continues to do exercises at home.  He recently stopped his zantac and is now taking a probiotic, digestive enzyme and licorice for reflux with relief of symptoms.  He is using Biotene, PreviDent and doing head and neck exercises.  He denies smoking, admits to 1-2 drinks every few months.  \par \par 4/18/19 - He returns today for routine follow-up.  He was last seen here on 1/2/19.  At that time, he was reporting fatigue, dry mouth, slight throat pain controlled with gabapentin and dysgeusia.  Plan was for PET/CT in 2 months and f/u with Dr. Barrios and PMD.  He Last saw Dr. Barrios on 2/25/19; DENISE on clinical exam.  Also saw SLP that day, advised to continue regular diet with thin liquids and head/neck exercises.  Last saw PMD in October.  He states he is due for routine visit with PMD and will schedule soon.   \par \par He will see Dr. Shmuel Alvarado in May for workup of L'Hermitte's which happens frequently. \par \par PET/CT 2/25/19 Impression: \par Since 9/12/2018, there has been a decrease in the size and FDG activity of the mass in the right tonsil, now with only minimal activity remaining within the edematous soft tissue at that site.  This likely represents postradiation change.  \par \par Today he reports feeling well overall.  He reports continued dry mouth, which occasionally makes swallowing difficult.  He is using Biotene mouth rinses, humidifier, taking liquids with meals, and sugar-free hard candy with relief.  He is tolerating a regular diet.  He is seeing OT biweekly for lymphedema therapy and doing head and neck exercises regularly.  He reports his taste is 65-70%.  He also reports continued fatigue, which is slowly improving.  \par \par Oncologic History\par Mr. 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 He was referred to Dr. Laguna (ENT) who recommended a MRI. MRI neck 8/15/18 showed a 3.7 x 2.4 x 2.2 cm enhancing lobular mass at the right palatine tonsil with inferior extension to the glossotonsillar sulcus.\par \par He consulted with  Dr. Galicia (ENT) who referred him to Dr. Carey and was then referred to Dr. Barrios for Right tonsillar lesion which was clinically suspicious for cancer. \par \par He underwent a DL/biopsy by Dr. Barrios 9/4/18 which showed exophytic right tonsil tumor extending into soft palate adjacent to uvula, involving entire right tonsil bed and lateral pharyngeal wall and extending into the Rt GTS.  Right tonsillar biopsy showed invasive squamous cell carcinoma, moderately differentiated and strongly and diffusely positive for p16.\par \par PET/CT 9/12/18 showed a right palatine tonsil mass measuring 3.7 x 2.4 x 2.2 cm which shows abnormal FDG activity with SUV max 11.9. There was 2 tiny nodules in the right middle lobe with FDG activity and a tiny focus of FDG activity in the liver. MRI abd 9/20/18- no liver lesions identified. \par \par His case was presented at tumor board with  consensus is for CRT. 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 He was referred to Shaina Munoz and [facility].\par \par Today he notes pain left side of tongue that has improved over past few days. Not sure if it is post anesthesia injection and has slight abdominal discomfort. Denies throat pain or dysphagia. Notes 25 lb wt loss over past 5 lbs due to "lifestyle changes" including not drinking alcohol. Today he has an appt. to see Dr. Cuevas.\par

## 2020-10-08 NOTE — PHYSICAL EXAM
[General Appearance - Alert] : alert [General Appearance - In No Acute Distress] : in no acute distress [Sclera] : the sclera and conjunctiva were normal [Extraocular Movements] : extraocular movements were intact [Examination Of The Oral Cavity] : the lips and gums were normal [Normal Oral Cavity] : oral cavity was normal [Oropharynx] : The oropharynx was normal [Normal] : no respiratory distress, lungs were clear to auscultation bilaterally [Heart Sounds] : normal S1 and S2 [Murmurs] : no murmurs present [Abdomen Soft] : soft [Abdomen Tenderness] : non-tender [Skin Color & Pigmentation] : normal skin color and pigmentation [No Focal Deficits] : no focal deficits [Oriented To Time, Place, And Person] : oriented to person, place, and time [de-identified] : good saliva, DENISE. s/p extraction of upper posterior molar tooth.  [de-identified] : mild fibrosis right neck  [de-identified] : no palpable cervical or supraclavicular lymphadenopathy [de-identified] : wearing a brace= RLE

## 2020-10-08 NOTE — DISEASE MANAGEMENT
[Pathological] : TNM Stage: p [FreeTextEntry4] : p16 positive right tonsil cancer [TTNM] : 2 [NTNM] : 0 [MTNM] : 0 [I] : I [de-identified] : oropharynx/ neck

## 2020-10-15 ENCOUNTER — APPOINTMENT (OUTPATIENT)
Dept: RADIATION ONCOLOGY | Facility: CLINIC | Age: 60
End: 2020-10-15
Payer: COMMERCIAL

## 2020-11-05 ENCOUNTER — APPOINTMENT (OUTPATIENT)
Dept: RADIATION ONCOLOGY | Facility: CLINIC | Age: 60
End: 2020-11-05
Payer: COMMERCIAL

## 2020-11-05 VITALS
BODY MASS INDEX: 28.33 KG/M2 | DIASTOLIC BLOOD PRESSURE: 90 MMHG | WEIGHT: 203.1 LBS | TEMPERATURE: 98.3 F | SYSTOLIC BLOOD PRESSURE: 132 MMHG | OXYGEN SATURATION: 100 % | RESPIRATION RATE: 15 BRPM | HEART RATE: 71 BPM

## 2020-11-05 DIAGNOSIS — Z86.59 PERSONAL HISTORY OF OTHER MENTAL AND BEHAVIORAL DISORDERS: ICD-10-CM

## 2020-11-05 PROCEDURE — 31575 DIAGNOSTIC LARYNGOSCOPY: CPT

## 2020-11-05 PROCEDURE — 99214 OFFICE O/P EST MOD 30 MIN: CPT | Mod: 25

## 2020-11-05 PROCEDURE — 99072 ADDL SUPL MATRL&STAF TM PHE: CPT

## 2020-11-05 NOTE — DISEASE MANAGEMENT
[Pathological] : TNM Stage: p [I] : I [FreeTextEntry4] : p16 positive right tonsil cancer [TTNM] : 2 [NTNM] : 0 [MTNM] : 0 [de-identified] : oropharynx/ neck

## 2020-11-05 NOTE — REVIEW OF SYSTEMS
[Dysphagia: Grade 0] : Dysphagia: Grade 0 [Mucositis Oral: Grade 0] : Mucositis Oral: Grade 0  [Xerostomia: Grade 1 - Symptomatic (e.g., dry or thick saliva) without significant dietary alteration; unstimulated saliva flow >0.2 ml/min] : Xerostomia: Grade 1 - Symptomatic (e.g., dry or thick saliva) without significant dietary alteration; unstimulated saliva flow >0.2 ml/min [Oral Pain: Grade 0] : Oral Pain: Grade 0 [Negative] : Integumentary [Fatigue: Grade 0] : Fatigue: Grade 0 [Salivary duct inflammation: Grade 0] : Salivary duct inflammation: Grade 0 [FreeTextEntry4] : see HPI

## 2020-11-05 NOTE — PROCEDURE
[Topical Lidocaine] : topical lidocaine [Flexible Endoscope] : examined with the flexible endoscope [Photographs Taken] : photographs taken [Normal] : normal [de-identified] : STABLE Sub-glottic tracheal lesion seen on anterior wall; whitish and smooth borders.

## 2020-11-05 NOTE — HISTORY OF PRESENT ILLNESS
[FreeTextEntry1] : Mr. 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 He tolerated treatment without unexpected complications.\par \par 11/4/2020- Follow-up\par Mr. Martinez returns for routine follow up. When he was last seen on 7/30/2020, he was advised on GERD management and importance of continued self care (Prevident, Biotene, H&N exercises), referred to cardiology for coronary artery calcification seen on imaging. Plan also included checking TSH and follow up with Dr. Barrios and dentist. TSH checked 7/30/2020 was WNL- 2.08. He followed up with Dr. Barrios on 8/5/2020; plan was for EUA and biopsy of the subglottic anterior tracheal wall lesion.  On 8/20/2020, he underwent EUA, which showed that the subglottic shelf was actually an anatomic variant of buckling between the cricoid cartilage and the larynx. Therefore, biopsy was not required. He then followed up with Dr. Barrios on 8/26/2020; plan was to follow up in 3 months (scheduled for 12/2/2020).\par \par He established care with cardiologist Dr. Guzman on 8/6/2020; plan was to start Atorvastatin, start ASA after surgery with Dr. Barrios, and consider a stress test in the future. Blood work also done, which was notable for slightly elevated LDL cholesterol of 109. He last followed up with dental in March. He also met his GP Dr. Oliva last week. \par \par Today, he reports feeling well.  He notes occasional jaw tightness and sinus dryness.  He denies pain, dysphagia, odynophagia.  Taste is at baseline.  He is eating a regular diet.  He is doing head and neck exercises regularly.  He denies tobacco and alcohol use.  \par \par 7/30/20- Follow-up\par Mr. Martinez returns for routine follow-up.  He was last seen on 11/7/19.  Plan was for CT neck and f/u in 8 months, f/u with Dr. Barrios in 4 months, continue PreviDent, Biotene, and head and neck exercises, referral to cardiology at [facility].  Dental recommendations were also discussed - no extraction for tooth #1 which received between 60-66 Gy.  \par \par CT neck with contrast 7/28/20 \par - No evidence of recurrent oropharyngeal mass or cervical lymphadenopathy.\par - However, a new subcentimeter polypoid focus of soft tissue density is identified along the right anterior wall of the cervical trachea, approximately 3 cm below the level of the cords. This is indeterminate for a polyp or mass, or possibly dense mucoid debris although it lies anteriorly rather than posteriorly. Direct inspection is suggested, or at least short interval follow-up. Mild asymmetric fullness of the right vocal cord is also noted, nonspecific but perhaps hypertrophic or edematous post-RT and without suspicious enhancement.\par \par He last followed up wit Dr. Barrios on 3/4/2020; plan was to follow up again in November.  He has not seen cardiology re: coronary artery calcifications.  He last saw dental in June; tooth was extracted. He is scheduled to see the dentist this afternoon for a routine cleaning.  \par \par Today, he notes he feels generally well. He notes fatigue at times, but energy is "generally good." He is able to eat a regular diet and his taste is 100%. He reports he forgets to do H&N exercises when he is "feeling good," but remembers when he has trouble with mouth ROM. He also reports xerostomia at times, only at night. He keeps water at the bedside. He also notes he has not been using Prevident and request a prescription to be sent to his pharmacy. Of note, he tore his right Achilles in February while playing basketball and is s/p repair on 2/26/2020. He otherwise feels well and denies CP, SOB, cough, dysphagia, odynophagia, fever, chills, or other complaints. 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 He does still experience acid reflux; has a GI doc and has been prescribed PPIs but prefers natural/diet-related remedies.\par \par \par 11/7/19 - Follow-up\par Mr. Martinez returns today for routine follow-up.  He was last seen on 4/18/19.  Plan was for CT neck in 6 months, follow-up with Dr. Barrios in July, neurology for L'Hermittes, dental follow-up in May or after, see cardiology for follow-up of coronary calcifications seen on imaging, continue OT/lymphedema therapy, continue Biotene and PreviDent, try Salivamax, daily head and neck exercises.\par \par CT neck with contrast 10/18/19 - No evidence of residual or recurrent right palatine tonsil mass.  No cervical lymphadenopathy.  \par \par He saw Dr. Alvarado for evaluation of L'Hermittes and underwent MRI of the thoracic and cervical spine.  Plan is for no further workup and PRN follow-up. \par \par MRI cervical spine with and without contrast 6/10/19 - IMPRESSION:\par 1.  No MR evidence of compressive or primary cervical myelopathy. \par 2.  No signficant spinal stenosis; minor spondylosis at C5-C6 and C6-7 with neural foraminal narrowing on the left at the former.  \par 3.  Radiation sequela is present in the prevertebral soft tissues and bone marrow. \par \par MRI thoracic spine with and without contrast 6/10/19 - IMPRESSION: No evidence of compressive or primary thoracic myelopathy.  Minimal spondylosis as above but otherwise unremarkable study. \par \par He last saw Dr. Barrios on 7/22/19.  He last saw dental a few weeks ago and was recommended extraction of his right upper wisdom tooth.  He has not seen a cardiologist and would like a referral.  Saw his PMD for a routine physical yesterday.  Today, he reports feeling generally well.  He notes continue fatigued that "ebbs and flows."  He also notes unchanged xerostomia.  He denies dysphagia and odynophagia, states he is eating a regular diet.  He completed lymphedema therapy and continues to do exercises at home.  He recently stopped his zantac and is now taking a probiotic, digestive enzyme and licorice for reflux with relief of symptoms.  He is using Biotene, PreviDent and doing head and neck exercises.  He denies smoking, admits to 1-2 drinks every few months.  \par \par 4/18/19 - He returns today for routine follow-up.  He was last seen here on 1/2/19.  At that time, he was reporting fatigue, dry mouth, slight throat pain controlled with gabapentin and dysgeusia.  Plan was for PET/CT in 2 months and f/u with Dr. Barrios and PMD.  He Last saw Dr. Barrios on 2/25/19; DENISE on clinical exam.  Also saw SLP that day, advised to continue regular diet with thin liquids and head/neck exercises.  Last saw PMD in October.  He states he is due for routine visit with PMD and will schedule soon.   \par \par He will see Dr. Shmuel Alvarado in May for workup of L'Hermitte's which happens frequently. \par \par PET/CT 2/25/19 Impression: \par Since 9/12/2018, there has been a decrease in the size and FDG activity of the mass in the right tonsil, now with only minimal activity remaining within the edematous soft tissue at that site.  This likely represents postradiation change.  \par \par Today he reports feeling well overall.  He reports continued dry mouth, which occasionally makes swallowing difficult.  He is using Biotene mouth rinses, humidifier, taking liquids with meals, and sugar-free hard candy with relief.  He is tolerating a regular diet.  He is seeing OT biweekly for lymphedema therapy and doing head and neck exercises regularly.  He reports his taste is 65-70%.  He also reports continued fatigue, which is slowly improving.  \par \par Oncologic History\par Mr. 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 He was referred to Dr. Laguna (ENT) who recommended a MRI. MRI neck 8/15/18 showed a 3.7 x 2.4 x 2.2 cm enhancing lobular mass at the right palatine tonsil with inferior extension to the glossotonsillar sulcus.\par \par He consulted with  Dr. Galicia (ENT) who referred him to Dr. Carey and was then referred to Dr. Barrios for Right tonsillar lesion which was clinically suspicious for cancer. \par \par He underwent a DL/biopsy by Dr. Barrios 9/4/18 which showed exophytic right tonsil tumor extending into soft palate adjacent to uvula, involving entire right tonsil bed and lateral pharyngeal wall and extending into the Rt GTS.  Right tonsillar biopsy showed invasive squamous cell carcinoma, moderately differentiated and strongly and diffusely positive for p16.\par \par PET/CT 9/12/18 showed a right palatine tonsil mass measuring 3.7 x 2.4 x 2.2 cm which shows abnormal FDG activity with SUV max 11.9. There was 2 tiny nodules in the right middle lobe with FDG activity and a tiny focus of FDG activity in the liver. MRI abd 9/20/18- no liver lesions identified. \par \par His case was presented at tumor board with  consensus is for CRT. 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 He was referred to Shaina Munoz and [facility].\par \par Today he notes pain left side of tongue that has improved over past few days. Not sure if it is post anesthesia injection and has slight abdominal discomfort. Denies throat pain or dysphagia. Notes 25 lb wt loss over past 5 lbs due to "lifestyle changes" including not drinking alcohol. Today he has an appt. to see Dr. Cuevas.\par

## 2020-11-05 NOTE — PHYSICAL EXAM
[General Appearance - Alert] : alert [General Appearance - In No Acute Distress] : in no acute distress [Sclera] : the sclera and conjunctiva were normal [Extraocular Movements] : extraocular movements were intact [Examination Of The Oral Cavity] : the lips and gums were normal [Normal Oral Cavity] : oral cavity was normal [Oropharynx] : The oropharynx was normal [Normal] : no respiratory distress, lungs were clear to auscultation bilaterally [Heart Sounds] : normal S1 and S2 [Skin Color & Pigmentation] : normal skin color and pigmentation [No Focal Deficits] : no focal deficits [Oriented To Time, Place, And Person] : oriented to person, place, and time [Heart Rate And Rhythm] : heart rate and rhythm were normal [de-identified] : good saliva, DENISE. s/p extraction of upper posterior molar tooth.  [de-identified] : mild fibrosis right neck  [de-identified] : no palpable cervical or supraclavicular lymphadenopathy

## 2020-12-02 ENCOUNTER — APPOINTMENT (OUTPATIENT)
Dept: OTOLARYNGOLOGY | Facility: CLINIC | Age: 60
End: 2020-12-02
Payer: COMMERCIAL

## 2020-12-02 VITALS
TEMPERATURE: 98 F | HEART RATE: 80 BPM | HEIGHT: 71 IN | WEIGHT: 200 LBS | OXYGEN SATURATION: 98 % | BODY MASS INDEX: 28 KG/M2 | SYSTOLIC BLOOD PRESSURE: 150 MMHG | DIASTOLIC BLOOD PRESSURE: 101 MMHG

## 2020-12-02 PROCEDURE — 31575 DIAGNOSTIC LARYNGOSCOPY: CPT

## 2020-12-02 PROCEDURE — 99213 OFFICE O/P EST LOW 20 MIN: CPT | Mod: 25

## 2020-12-02 PROCEDURE — 99072 ADDL SUPL MATRL&STAF TM PHE: CPT

## 2020-12-16 NOTE — REASON FOR VISIT
[Subsequent Evaluation] : a subsequent evaluation for [Spouse] : spouse [FreeTextEntry2] : surveillance of right tonsil cancer

## 2020-12-16 NOTE — HISTORY OF PRESENT ILLNESS
[de-identified] : 60M with [id number] p16+ right tonsil SCC treated with definitive EBRT with 6996 cGy to the oropharynx/neck from 10/11/18 - 11/27/18. He tolerated treatment without unexpected complications. \par \par 7/28/20 CT neck: No evidence of recurrent oropharyngeal mass or cervical lymphadenopathy. However, a new subcm polypoid focus of soft tissue density is identified along the right anterior wall of the cervical trachea, approximately 3 cm below the level of the cords, This is indeterminate for a polyp or mass, or possibly dense mucoid debris although it lies anteriorly rather than posteriorly. Mild asymmetric fullness of the right vocal cord is also noted, nonspecific abut perhaps hypertropic or edematous post-RT and without suspicious enhancement.\par \par He is s/p DL and EUA on 8/20/20. Polypoid focus on the anterior wall of the trachea on CT imaging was an anatomic variant of the cricoid cartilage, there was no tumor to biopsy. \par \par He saw Dr. Munoz on 11/5/20.  He is doing well. Eating well, weight gain. Taste back to 90%, sometimes food tastes saltier or blander than expected.  No fever, chills, new masses or lesions, trismus, or dyspnea.  Voice ok.  Reports C5 disc degeneration, right neck tightness, occasional right neck pain radiating to the head, doing stretching exercises.\par \par His wife is recording the visit.

## 2020-12-16 NOTE — PHYSICAL EXAM
[Normal] : mucosa is normal [Laryngoscopy Performed] : laryngoscopy was performed, see procedure section for findings [de-identified] : no lymphedema noted, no masses or lesions

## 2020-12-16 NOTE — ASSESSMENT
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 No evidence of OPSCC or manoj recurrence today.\par \par Plan:\par - f/u 6 months\par - f/u Dr. Munoz 1 year\par - For DDD, if symptoms persist, see PCP.\par - Pt verbalized understanding of above.

## 2020-12-16 NOTE — PROCEDURE
[Image(s) Captured] : image(s) captured and filed [Unable to Cooperate with Mirror] : patient unable to cooperate with mirror [de-identified] : Flexible fiberoptic laryngoscope advanced orally, mild RT mucosa changes, no oropharyngeal lesions or masses identified, larynx visualized, adequate and symmetric cord adduction and abduction noted.\par  [de-identified] : surveillance of tonsil cancer

## 2020-12-23 PROBLEM — Z01.818 ENCOUNTER FOR PREOPERATIVE EXAMINATION FOR GENERAL SURGICAL PROCEDURE: Status: RESOLVED | Noted: 2018-08-23 | Resolved: 2020-12-23

## 2021-02-03 NOTE — REASON FOR VISIT
Pt up [address] with assist of 1  Pt is unsteady at this time  Pt  Has a brief on but so far has not been inc  Of urine or stool for my shift  [Initial Evaluation] : an initial evaluation of

## 2021-02-08 NOTE — PHYSICAL EXAM
Nutrition Problem #1: Severe malnutrition, In context of chronic, non-illness related  Intervention: Food and/or Nutrient Delivery: Continue Current Diet, Start Oral Nutrition Supplement  Nutritional Goals: PO intakes are greater than 75% at meals [General Appearance - Alert] : alert [General Appearance - In No Acute Distress] : in no acute distress [Oriented To Time, Place, And Person] : oriented to person, place, and time [Normal] : no joint swelling, no clubbing or cyanosis of the fingernails and muscle strength and tone were normal [de-identified] : grade 2 mucositis upper palate, posterior buccal  [de-identified] : hyperpigmentation anterior and posterior neck, small open area anterior neck

## 2021-06-02 ENCOUNTER — APPOINTMENT (OUTPATIENT)
Dept: OTOLARYNGOLOGY | Facility: CLINIC | Age: 61
End: 2021-06-02
Payer: COMMERCIAL

## 2021-06-02 VITALS
TEMPERATURE: 98.1 F | DIASTOLIC BLOOD PRESSURE: 87 MMHG | HEIGHT: 71 IN | OXYGEN SATURATION: 98 % | WEIGHT: 210 LBS | HEART RATE: 80 BPM | BODY MASS INDEX: 29.4 KG/M2 | SYSTOLIC BLOOD PRESSURE: 142 MMHG

## 2021-06-02 PROCEDURE — 99072 ADDL SUPL MATRL&STAF TM PHE: CPT

## 2021-06-02 PROCEDURE — 31575 DIAGNOSTIC LARYNGOSCOPY: CPT

## 2021-06-03 NOTE — PROCEDURE
[Image(s) Captured] : image(s) captured and filed [Unable to Cooperate with Mirror] : patient unable to cooperate with mirror [None] : none [Flexible Endoscope] : examined with the flexible endoscope [de-identified] : Flexible fiberoptic laryngoscope advanced orally, mild RT mucosa changes, no oropharyngeal lesions or masses identified, larynx visualized, adequate and symmetric cord adduction and abduction noted, mucus secretions on posterior pharyngeal wall.\par  [de-identified] : surveillance of tonsil cancer

## 2021-06-03 NOTE — PHYSICAL EXAM
[Laryngoscopy Performed] : laryngoscopy was performed, see procedure section for findings [Normal] : no rashes [de-identified] : no lymphedema noted, no masses or lesions

## 2021-06-03 NOTE — ASSESSMENT
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 No evidence of OPSCC or manoj recurrence today.\par \par Plan:\par - f/u Dr. Munoz 6 months.\par - f/u 1 year.\par - RTC sooner should any worrisome symptoms develop.\par - Pt verbalized understanding of above. \par

## 2021-06-03 NOTE — HISTORY OF PRESENT ILLNESS
[de-identified] : 61M with [id number] p16+ right tonsil SCC treated with definitive EBRT with 6996 cGy to the oropharynx/neck from 10/11/18 - 11/27/18. He tolerated treatment without unexpected complications. \par \par 7/28/20 CT neck: No evidence of recurrent oropharyngeal mass or cervical lymphadenopathy. However, a new subcm polypoid focus of soft tissue density is identified along the right anterior wall of the cervical trachea, approximately 3 cm below the level of the cords, This is indeterminate for a polyp or mass, or possibly dense mucoid debris although it lies anteriorly rather than posteriorly. Mild asymmetric fullness of the right vocal cord is also noted, nonspecific abut perhaps hypertropic or edematous post-RT and without suspicious enhancement.\par \par He is s/p DL and EUA on 8/20/20. Polypoid focus on the anterior wall of the trachea on CT imaging was an anatomic variant of the cricoid cartilage, there was no tumor to biopsy. \par \par He saw Dr. Munoz on 11/5/20. He is doing well. Eating well, no weight loss. Taste is still abnormal. No fever, chills, new masses or lesions, trismus, or dyspnea. Voice ok. Continues to do stretching exercises.\par \par His wife is recording the visit. \par

## 2021-06-04 ENCOUNTER — APPOINTMENT (OUTPATIENT)
Dept: HEART AND VASCULAR | Facility: CLINIC | Age: 61
End: 2021-06-04
Payer: COMMERCIAL

## 2021-06-04 ENCOUNTER — LABORATORY RESULT (OUTPATIENT)
Age: 61
End: 2021-06-04

## 2021-06-04 VITALS
TEMPERATURE: 98.2 F | HEART RATE: 87 BPM | HEIGHT: 71 IN | BODY MASS INDEX: 29.26 KG/M2 | WEIGHT: 209 LBS | SYSTOLIC BLOOD PRESSURE: 138 MMHG | DIASTOLIC BLOOD PRESSURE: 90 MMHG

## 2021-06-04 DIAGNOSIS — R60.0 LOCALIZED EDEMA: ICD-10-CM

## 2021-06-04 PROCEDURE — 93306 TTE W/DOPPLER COMPLETE: CPT

## 2021-06-04 PROCEDURE — 93000 ELECTROCARDIOGRAM COMPLETE: CPT

## 2021-06-04 PROCEDURE — 99214 OFFICE O/P EST MOD 30 MIN: CPT

## 2021-06-04 PROCEDURE — 99072 ADDL SUPL MATRL&STAF TM PHE: CPT

## 2021-06-04 NOTE — HISTORY OF PRESENT ILLNESS
[FreeTextEntry1] : 61 year old M with PMHx of heavy coronary calcification (seen on PET CT 3/2019), HLD, hx of head and neck cancer (s/p radiation) - now in remission, GERD who presents for follow up. \par \par Has been on and off with lipitor 40 mg, has been on in consistently for 6-8 weeks. Prior to that, he felt it made him sluggish, had joint aches. He is now taking it night, symptoms are much improved. He denies any exertional sx such as CP or SOB. He reports some LE edema last month which has now resolved. He is trying to eat plant based. Tore his achilles, has been more sedentary.

## 2021-06-04 NOTE — DISCUSSION/SUMMARY
[FreeTextEntry1] : 61 year old M with PMHx of heavy coronary calcification (seen on PETCT 3/2019), HLD,  hx of head and neck cancer (s/p radiation) - now in remission, GERD who presents for follow up. \par \par Coronary calcifications (seen on PETCT 3/2019)\par - Continue lipitor 40 mg and aspirin 81 mg \par - Lipid profile 8/2020 TG 56, HDL 70, \par - Check lipid profile today, LDL goal < 70 mg dL\par - EKG without ischemic changes \par \par Elevated BP readings\par - Currently not on antihypertensive therapy \par - We have asked patient to keep a log of BP over the next week \par - He will send us his readings through portal\par - Obtain 2D echocardiogram \par

## 2021-06-08 ENCOUNTER — TRANSCRIPTION ENCOUNTER (OUTPATIENT)
Age: 61
End: 2021-06-08

## 2021-06-08 LAB
25(OH)D3 SERPL-MCNC: 17.2 NG/ML
ALBUMIN SERPL ELPH-MCNC: 5 G/DL
ALP BLD-CCNC: 102 U/L
ALT SERPL-CCNC: 12 U/L
ANION GAP SERPL CALC-SCNC: 24 MMOL/L
AST SERPL-CCNC: 21 U/L
BASOPHILS # BLD AUTO: 0.03 K/UL
BASOPHILS NFR BLD AUTO: 0.9 %
BILIRUB SERPL-MCNC: 0.9 MG/DL
BUN SERPL-MCNC: 13 MG/DL
CALCIUM SERPL-MCNC: 10.4 MG/DL
CHLORIDE SERPL-SCNC: 105 MMOL/L
CHOLEST SERPL-MCNC: 147 MG/DL
CHOLEST SERPL-MCNC: 152 MG/DL
CO2 SERPL-SCNC: 14 MMOL/L
CREAT SERPL-MCNC: 0.99 MG/DL
EOSINOPHIL # BLD AUTO: 0.05 K/UL
EOSINOPHIL NFR BLD AUTO: 1.5 %
ESTIMATED AVERAGE GLUCOSE: 108 MG/DL
GLUCOSE SERPL-MCNC: 97 MG/DL
HBA1C MFR BLD HPLC: 5.4 %
HCT VFR BLD CALC: 47.7 %
HDLC SERPL-MCNC: 54 MG/DL
HDLC SERPL-MCNC: 54 MG/DL
HGB BLD-MCNC: 15.1 G/DL
IMM GRANULOCYTES NFR BLD AUTO: 0 %
LDLC SERPL CALC-MCNC: 79 MG/DL
LDLC SERPL CALC-MCNC: 83 MG/DL
LYMPHOCYTES # BLD AUTO: 0.45 K/UL
LYMPHOCYTES NFR BLD AUTO: 13.7 %
MAN DIFF?: NORMAL
MCHC RBC-ENTMCNC: 29.8 PG
MCHC RBC-ENTMCNC: 31.7 GM/DL
MCV RBC AUTO: 94.1 FL
MONOCYTES # BLD AUTO: 0.33 K/UL
MONOCYTES NFR BLD AUTO: 10 %
NEUTROPHILS # BLD AUTO: 2.43 K/UL
NEUTROPHILS NFR BLD AUTO: 73.9 %
NONHDLC SERPL-MCNC: 93 MG/DL
NONHDLC SERPL-MCNC: 98 MG/DL
PLATELET # BLD AUTO: 262 K/UL
POTASSIUM SERPL-SCNC: 5.3 MMOL/L
PROT SERPL-MCNC: 7.8 G/DL
PSA FREE SERPL-MCNC: 0.34 NG/ML
RBC # BLD: 5.07 M/UL
RBC # FLD: 12.5 %
SODIUM SERPL-SCNC: 143 MMOL/L
TRIGL SERPL-MCNC: 72 MG/DL
TRIGL SERPL-MCNC: 76 MG/DL
TSH SERPL-ACNC: 1.71 UIU/ML
WBC # FLD AUTO: 3.29 K/UL

## 2021-12-07 ENCOUNTER — APPOINTMENT (OUTPATIENT)
Dept: RADIATION ONCOLOGY | Facility: CLINIC | Age: 61
End: 2021-12-07

## 2021-12-13 ENCOUNTER — APPOINTMENT (OUTPATIENT)
Dept: RADIATION ONCOLOGY | Facility: CLINIC | Age: 61
End: 2021-12-13
Payer: SELF-PAY

## 2021-12-13 VITALS
TEMPERATURE: 98 F | SYSTOLIC BLOOD PRESSURE: 138 MMHG | DIASTOLIC BLOOD PRESSURE: 90 MMHG | OXYGEN SATURATION: 100 % | HEIGHT: 71 IN | BODY MASS INDEX: 27.3 KG/M2 | HEART RATE: 71 BPM | WEIGHT: 195 LBS

## 2021-12-13 DIAGNOSIS — C09.9 MALIGNANT NEOPLASM OF TONSIL, UNSPECIFIED: ICD-10-CM

## 2021-12-13 PROCEDURE — 99212 OFFICE O/P EST SF 10 MIN: CPT

## 2021-12-13 NOTE — PHYSICAL EXAM
[General Appearance - Alert] : alert [General Appearance - In No Acute Distress] : in no acute distress [Sclera] : the sclera and conjunctiva were normal [Extraocular Movements] : extraocular movements were intact [Examination Of The Oral Cavity] : the lips and gums were normal [Normal Oral Cavity] : oral cavity was normal [Oropharynx] : The oropharynx was normal [Normal] : no respiratory distress, lungs were clear to auscultation bilaterally [Heart Rate And Rhythm] : heart rate and rhythm were normal [Heart Sounds] : normal S1 and S2 [Skin Color & Pigmentation] : normal skin color and pigmentation [No Focal Deficits] : no focal deficits [de-identified] : BMI is stable at 25-27 [de-identified] : good saliva, DENISE. s/p extraction of upper posterior molar tooth.  overalll dentition is good condition [de-identified] : mild fibrosis right neck, no masses [de-identified] : no palpable cervical or supraclavicular lymphadenopathy

## 2021-12-13 NOTE — REVIEW OF SYSTEMS
[Negative] : Integumentary [Dysphagia: Grade 0] : Dysphagia: Grade 0 [Fatigue: Grade 0] : Fatigue: Grade 0 [Mucositis Oral: Grade 0] : Mucositis Oral: Grade 0  [Xerostomia: Grade 1 - Symptomatic (e.g., dry or thick saliva) without significant dietary alteration; unstimulated saliva flow >0.2 ml/min] : Xerostomia: Grade 1 - Symptomatic (e.g., dry or thick saliva) without significant dietary alteration; unstimulated saliva flow >0.2 ml/min [Oral Pain: Grade 0] : Oral Pain: Grade 0 [Salivary duct inflammation: Grade 0] : Salivary duct inflammation: Grade 0 [Pruritus: Grade 0] : Pruritus: Grade 0 [Skin Atrophy: Grade 0] : Skin Atrophy: Grade 0 [Skin Hyperpigmentation: Grade 0] : Skin Hyperpigmentation: Grade 0 [FreeTextEntry4] : see HPI

## 2021-12-13 NOTE — PROCEDURE
[Topical Lidocaine] : topical lidocaine [Flexible Endoscope] : examined with the flexible endoscope [Photographs Taken] : photographs taken [Normal] : normal [de-identified] : STABLE Sub-glottic tracheal lesion seen on anterior wall; whitish and smooth borders.

## 2021-12-13 NOTE — HISTORY OF PRESENT ILLNESS
[FreeTextEntry1] : Mr. Jimbo Martinez is a 61 year old male has completed definitive EBRT with 6996 cGy to the oropharynx/neck for p16 positive squamous cell carcinoma of the right tonsil from 10/11/18-11/27/18. He tolerated treatment without unexpected complications.  \par \par 12/13/21 - Follow -Up\par Mr. Martinez returns for routine follow up with his wife. He was last seen on 11/5/2020. Complaints of alteration in taste on and off, right neck muscle tightness and right TMJ tenderness, has difficulty opening his mouth sometimes. Compliant with neck exercises. No change in voice. DOn digital exam, no palpable findings.  Scoped by  on 6/2/21.\par \par 11/5/2020- Follow-up\par Mr. Martinez returns for routine follow up. When he was last seen on 7/30/2020, he was advised on GERD management and importance of continued self care (Prevident, Biotene, H&N exercises), referred to cardiology for coronary artery calcification seen on imaging. Plan also included checking TSH and follow up with Dr. Barrios and dentist. TSH checked 7/30/2020 was WNL- 2.08. He followed up with Dr. Barrios on 8/5/2020; plan was for EUA and biopsy of the subglottic anterior tracheal wall lesion.  On 8/20/2020, he underwent EUA, which showed that the subglottic shelf was actually an anatomic variant of buckling between the cricoid cartilage and the larynx. Therefore, biopsy was not required. He then followed up with Dr. Barrios on 8/26/2020; plan was to follow up in 3 months (scheduled for 12/2/2020).\par \par He established care with cardiologist Dr. Guzman on 8/6/2020; plan was to start Atorvastatin, start ASA after surgery with Dr. Barrios, and consider a stress test in the future. Blood work also done, which was notable for slightly elevated LDL cholesterol of 109. He last followed up with dental in March. He also met his GP Dr. Oliva last week. \par \par Today, he reports feeling well.  He notes occasional jaw tightness and sinus dryness.  He denies pain, dysphagia, odynophagia.  Taste is at baseline.  He is eating a regular diet.  He is doing head and neck exercises regularly.  He denies tobacco and alcohol use.  \par \par 7/30/20- Follow-up\par Mr. Martinez returns for routine follow-up.  He was last seen on 11/7/19.  Plan was for CT neck and f/u in 8 months, f/u with Dr. Barrios in 4 months, continue PreviDent, Biotene, and head and neck exercises, referral to [facility]ology at [facility].  Dental recommendations were also discussed - no extraction for tooth #1 which received between 60-66 Gy.  \par \par CT neck with contrast 7/28/20 \par - No evidence of recurrent oropharyngeal mass or cervical lymphadenopathy.\par - However, a new subcentimeter polypoid focus of soft tissue density is identified along the right anterior wall of the cervical trachea, approximately 3 cm below the level of the cords. This is indeterminate for a polyp or mass, or possibly dense mucoid debris although it lies anteriorly rather than posteriorly. Direct inspection is suggested, or at least short interval follow-up. Mild asymmetric fullness of the right vocal cord is also noted, nonspecific but perhaps hypertrophic or edematous post-RT and without suspicious enhancement.\par \par He last followed up wit Dr. Barrios on 3/4/2020; plan was to follow up again in November.  He has not seen cardiology re: coronary artery calcifications.  He last saw dental in June; tooth was extracted. He is scheduled to see the dentist this afternoon for a routine cleaning.  \par \par Today, he notes he feels generally well. He notes fatigue at times, but energy is "generally good." He is able to eat a regular diet and his taste is 100%. He reports he forgets to do H&N exercises when he is "feeling good," but remembers when he has trouble with mouth ROM. He also reports xerostomia at times, only at night. He keeps water at the bedside. He also notes he has not been using Prevident and request a prescription to be sent to his pharmacy. Of note, he tore his right Achilles in February while playing basketball and is s/p repair on 2/26/2020. He otherwise feels well and denies CP, SOB, cough, dysphagia, odynophagia, fever, chills, or other complaints. 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 He does still experience acid reflux; has a GI doc and has been prescribed PPIs but prefers natural/diet-related remedies.\par \par \par 11/7/19 - Follow-up\par Mr. Martinez returns today for routine follow-up.  He was last seen on 4/18/19.  Plan was for CT neck in 6 months, follow-up with Dr. Barrios in July, neurology for L'Hermittes, dental follow-up in May or after, see cardiology for follow-up of coronary calcifications seen on imaging, continue OT/lymphedema therapy, continue Biotene and PreviDent, try Salivamax, daily head and neck exercises.\par \par CT neck with contrast 10/18/19 - No evidence of residual or recurrent right palatine tonsil mass.  No cervical lymphadenopathy.  \par \par He saw Dr. Alvarado for evaluation of L'Hermportia and underwent MRI of the thoracic and cervical spine.  Plan is for no further workup and PRN follow-up. \par \par MRI cervical spine with and without contrast 6/10/19 - IMPRESSION:\par 1.  No MR evidence of compressive or primary cervical myelopathy. \par 2.  No signficant spinal stenosis; minor spondylosis at C5-C6 and C6-7 with neural foraminal narrowing on the left at the former.  \par 3.  Radiation sequela is present in the prevertebral soft tissues and bone marrow. \par \par MRI thoracic spine with and without contrast 6/10/19 - IMPRESSION: No evidence of compressive or primary thoracic myelopathy.  Minimal spondylosis as above but otherwise unremarkable study. \par \par He last saw Dr. Barrios on 7/22/19.  He last saw dental a few weeks ago and was recommended extraction of his right upper wisdom tooth.  He has not seen a cardiologist and would like a referral.  Saw his PMD for a routine physical yesterday.  Today, he reports feeling generally well.  He notes continue fatigued that "ebbs and flows."  He also notes unchanged xerostomia.  He denies dysphagia and odynophagia, states he is eating a regular diet.  He completed lymphedema therapy and continues to do exercises at home.  He recently stopped his zantac and is now taking a probiotic, digestive enzyme and licorice for reflux with relief of symptoms.  He is using Biotene, PreviDent and doing head and neck exercises.  He denies smoking, admits to 1-2 drinks every few months.  \par \par 4/18/19 - He returns today for routine follow-up.  He was last seen here on 1/2/19.  At that time, he was reporting fatigue, dry mouth, slight throat pain controlled with gabapentin and dysgeusia.  Plan was for PET/CT in 2 months and f/u with Dr. Barrios and PMD.  He Last saw Dr. Barrios on 2/25/19; DENISE on clinical exam.  Also saw SLP that day, advised to continue regular diet with thin liquids and head/neck exercises.  Last saw PMD in October.  He states he is due for routine visit with PMD and will schedule soon.   \par \par He will see Dr. Shmuel Alvarado in May for workup of L'Hermitte's which happens frequently. \par \par PET/CT 2/25/19 Impression: \par Since 9/12/2018, there has been a decrease in the size and FDG activity of the mass in the right tonsil, now with only minimal activity remaining within the edematous soft tissue at that site.  This likely represents postradiation change.  \par \par Today he reports feeling well overall.  He reports continued dry mouth, which occasionally makes swallowing difficult.  He is using Biotene mouth rinses, humidifier, taking liquids with meals, and sugar-free hard candy with relief.  He is tolerating a regular diet.  He is seeing OT biweekly for lymphedema therapy and doing head and neck exercises regularly.  He reports his taste is 65-70%.  He also reports continued fatigue, which is slowly improving.  \par \par Oncologic History\par Mr. 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 He was referred to Dr. Laguna (ENT) who recommended a MRI. MRI neck 8/15/18 showed a 3.7 x 2.4 x 2.2 cm enhancing lobular mass at the right palatine tonsil with inferior extension to the glossotonsillar sulcus.\par \par He consulted with  Dr. Galicia (ENT) who referred him to Dr. Carey and was then referred to Dr. Barrios for Right tonsillar lesion which was clinically suspicious for cancer. \par \par He underwent a DL/biopsy by Dr. Barrios 9/4/18 which showed exophytic right tonsil tumor extending into soft palate adjacent to uvula, involving entire right tonsil bed and lateral pharyngeal wall and extending into the Rt GTS.  Right tonsillar biopsy showed invasive squamous cell carcinoma, moderately differentiated and strongly and diffusely positive for p16.\par \par PET/CT 9/12/18 showed a right palatine tonsil mass measuring 3.7 x 2.4 x 2.2 cm which shows abnormal FDG activity with SUV max 11.9. There was 2 tiny nodules in the right middle lobe with FDG activity and a tiny focus of FDG activity in the liver. MRI abd 9/20/18- no liver lesions identified. \par \par His case was presented at tumor board with  consensus is for CRT. 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 He was referred to Shaina Munoz and [facility].\par \par Today he notes pain left side of tongue that has improved over past few days. Not sure if it is post anesthesia injection and has slight abdominal discomfort. Denies throat pain or dysphagia. Notes 25 lb wt loss over past 5 lbs due to "lifestyle changes" including not drinking alcohol. Today he has an appt. to see Dr. Cuevas.\par

## 2021-12-13 NOTE — DISEASE MANAGEMENT
[Pathological] : TNM Stage: p [I] : I [FreeTextEntry4] : p16 positive right tonsil cancer [TTNM] : 2 [MTNM] : 0 [NTNM] : 0 [de-identified] : oropharynx/ neck

## 2022-10-12 ENCOUNTER — APPOINTMENT (OUTPATIENT)
Dept: ORTHOPEDIC SURGERY | Facility: CLINIC | Age: 62
End: 2022-10-12

## 2022-10-12 ENCOUNTER — RESULT REVIEW (OUTPATIENT)
Age: 62
End: 2022-10-12

## 2022-10-12 ENCOUNTER — OUTPATIENT (OUTPATIENT)
Dept: OUTPATIENT SERVICES | Facility: HOSPITAL | Age: 62
LOS: 1 days | End: 2022-10-12
Payer: COMMERCIAL

## 2022-10-12 VITALS
SYSTOLIC BLOOD PRESSURE: 159 MMHG | BODY MASS INDEX: 27.3 KG/M2 | DIASTOLIC BLOOD PRESSURE: 97 MMHG | HEIGHT: 71 IN | OXYGEN SATURATION: 96 % | WEIGHT: 195 LBS | HEART RATE: 73 BPM

## 2022-10-12 DIAGNOSIS — M50.30 OTHER CERVICAL DISC DEGENERATION, UNSPECIFIED CERVICAL REGION: ICD-10-CM

## 2022-10-12 DIAGNOSIS — Z98.890 OTHER SPECIFIED POSTPROCEDURAL STATES: Chronic | ICD-10-CM

## 2022-10-12 DIAGNOSIS — G89.29 CERVICALGIA: ICD-10-CM

## 2022-10-12 DIAGNOSIS — Z41.9 ENCOUNTER FOR PROCEDURE FOR PURPOSES OTHER THAN REMEDYING HEALTH STATE, UNSPECIFIED: Chronic | ICD-10-CM

## 2022-10-12 DIAGNOSIS — M54.2 CERVICALGIA: ICD-10-CM

## 2022-10-12 PROCEDURE — 99204 OFFICE O/P NEW MOD 45 MIN: CPT

## 2022-10-12 PROCEDURE — 72050 X-RAY EXAM NECK SPINE 4/5VWS: CPT | Mod: 26

## 2022-10-12 PROCEDURE — 72050 X-RAY EXAM NECK SPINE 4/5VWS: CPT

## 2022-10-13 NOTE — ADDENDUM
[FreeTextEntry1] : Documented by Esther Hobbs acting as a scribe for Dr. Bolivar Noble on 10/12/2022.

## 2022-10-13 NOTE — END OF VISIT
[FreeTextEntry3] : All medical record entries made by the Scribe were at my, Dr. Bolivar Noble, direction and personally dictated by me on 10/12/2022 . I have reviewed the chart and agree that the record accurately reflects my personal performance of the history, physical exam, assessment and plan. I have also personally directed, reviewed, and agreed with the chart.

## 2022-10-13 NOTE — DISCUSSION/SUMMARY
[de-identified] : Diagnosis: Cervical radiculopathy, cervical disc disease.\par \par I have discussed my findings with the patient. I do feel the patient should get further imaging given the chronicity and extent of his symptoms. I have ordered an MRI Cervical with or without contrast. It is ordered with or without contrast due to his cancer history. Patient will follow up after this is done for review and determination of plan. Prescription given for 1 tab of Valium 5 mg to be taken 1 hour before his procedure for claustrophobia.

## 2022-10-13 NOTE — PHYSICAL EXAM
[de-identified] : Physical Exam:\par \par General: patient is well developed, well nourished, in no acute\par distress, alert and oriented x 3.\par \par Mood and affect: normal\par \par Respiratory: no respiratory distress noted\par \par Skin: no scars over spine, skin intact, no erythema, increased warmth\par \par Alignment: The spine is well compensated in the coronal and sagittal plane.\par \par Gait: The patient is able to toe walk and heel walk without difficulty.\par \par Palpation: tender to palpation cervical paraspinal region\par \par Range of motion: Cervical spine ROM is full\par \par Neurologic Exam:\par Motor: Manual Muscle testing in the upper and lower extremities is 5 out of 5 in all muscle groups. There is no evidence of\par muscular atrophy in the upper extremities. Sensory: Sensation to light touch is grossly intact in the upper and lower\par extremities\par \par Reflexes: DTR are present and symmetric throughout, negative torres bilaterally, negative inverted radial reflex bilaterally,\par no clonus, plantar responses are flexor\par \par Special tests: Spurlings sign absent. Lhermitte's sign is absent.\par \par Vascular: Examination of the peripheral vascular system demonstrates no evidence of congestion or edema. no\par lymphedema bilateral lower extremities, pulses are present and symmetric in both lower extremities. [de-identified] : Cervical Spine AP/Lateral flexion/extension 10/12/2022 [my read]: Mild disc degeneration C5/6 with anterior osteophyte formation. No instability, no fractures seen.

## 2022-10-13 NOTE — HISTORY OF PRESENT ILLNESS
[de-identified] : Mr. Martinez was referred by Dr. Munoz. He reports neck pain for approximately the past 5 years, worse over the past 6 months, atraumatic. Patient was diagnosed with head and neck cancer in 2018 and underwent radiation treatment. He has been in remission for the past 3 years. He reports pain is primarily localized to the right side of his neck and radiates behind his ear to the right side of his jaw. He also reports left-sided neck pain, onset several months ago, and diffuse paraesthesias down his left upper extremity to all fingers.

## 2022-11-02 ENCOUNTER — APPOINTMENT (OUTPATIENT)
Dept: ORTHOPEDIC SURGERY | Facility: CLINIC | Age: 62
End: 2022-11-02

## 2022-11-02 DIAGNOSIS — M48.02 SPINAL STENOSIS, CERVICAL REGION: ICD-10-CM

## 2022-11-02 DIAGNOSIS — M54.12 RADICULOPATHY, CERVICAL REGION: ICD-10-CM

## 2022-11-02 PROCEDURE — 99214 OFFICE O/P EST MOD 30 MIN: CPT

## 2022-11-08 PROBLEM — M48.02 CERVICAL STENOSIS OF SPINE: Status: ACTIVE | Noted: 2022-11-08

## 2022-11-08 PROBLEM — M54.12 CERVICAL RADICULOPATHY: Status: ACTIVE | Noted: 2022-11-08

## 2022-11-08 NOTE — HISTORY OF PRESENT ILLNESS
[de-identified] : Follow up 11/02/2022: Mr. Martinez is here for follow up. He reports some improvement in his neck symptoms. He still has pain in his neck that radiates to his bilateral shoulder area and down his left upper extremity. He had an MRI Cervical Spine and is here for review.\par \par 10/12/2022: Mr. Martinez was referred by Dr. Munoz. He reports neck pain for approximately the past 5 years, worse over the past 6 months, atraumatic. Patient was diagnosed with head and neck cancer in 2018 and underwent radiation treatment. He has been in remission for the past 3 years. He reports pain is primarily localized to the right side of his neck and radiates behind his ear to the right side of his jaw. He also reports left-sided neck pain, onset several months ago, and diffuse paraesthesias down his left upper extremity to all fingers.

## 2022-11-08 NOTE — DISCUSSION/SUMMARY
[de-identified] : Diagnosis: Cervical disc disease, cervical radiculopathy.\par \par I have discussed my findings with the patient. I have recommended a course of physical therapy. He is not a great candidate for NSAID course due to his history of reflux as well as coronary artery disease. I discussed with him that I would avoid NSAID treatment at this time. If the physical therapy does not help his symptoms, the next step would be interventional approach with injections. I would refer him to Dr. Sivakumar Delarosa or Dr. Chandrakant Rogers.

## 2022-11-08 NOTE — END OF VISIT
[FreeTextEntry3] : All medical record entries made by the Scribe were at my, Dr. Bolivar Noble, direction and personally dictated by me on 11/03/2022 . I have reviewed the chart and agree that the record accurately reflects my personal performance of the history, physical exam, assessment and plan. I have also personally directed, reviewed, and agreed with the chart.

## 2022-11-08 NOTE — PHYSICAL EXAM
[de-identified] : Physical Exam:\par \par General: patient is well developed, well nourished, in no acute\par distress, alert and oriented x 3.\par \par Mood and affect: normal\par \par Respiratory: no respiratory distress noted\par \par Skin: no scars over spine, skin intact, no erythema, increased warmth\par \par Alignment: The spine is well compensated in the coronal and sagittal plane.\par \par Gait: The patient is able to toe walk and heel walk without difficulty.\par \par Palpation: tender to palpation cervical paraspinal region\par \par Range of motion: Cervical spine ROM is full\par \par Neurologic Exam:\par Motor: Manual Muscle testing in the upper and lower extremities is 5 out of 5 in all muscle groups. There is no evidence of\par muscular atrophy in the upper extremities. Sensory: Sensation to light touch is grossly intact in the upper and lower\par extremities\par \par Reflexes: DTR are present and symmetric throughout, negative torres bilaterally, negative inverted radial reflex bilaterally,\par no clonus, plantar responses are flexor\par \par Special tests: Spurlings sign absent. Lhermitte's sign is absent.\par \par Vascular: Examination of the peripheral vascular system demonstrates no evidence of congestion or edema. no\par lymphedema bilateral lower extremities, pulses are present and symmetric in both lower extremities. [de-identified] : MRI Cervical Spine 10/26/2022: Moderate left-sided C5/6 foraminal stenosis. There is mild neuroforaminal stenosis seen at C6/7. There is mild disc degeneration seen at C5/6 and C6/7. \par \par XR Cervical Spine AP/Lateral flexion/extension 10/12/2022 [my read]: Mild disc degeneration C5/6 with anterior osteophyte formation. No instability, no fractures seen.

## 2022-11-08 NOTE — ADDENDUM
[FreeTextEntry1] : Documented by Esther Hobbs acting as a scribe for Dr. Bolivar Noble on 11/03/2022.

## 2022-11-11 ENCOUNTER — APPOINTMENT (OUTPATIENT)
Dept: HEART AND VASCULAR | Facility: CLINIC | Age: 62
End: 2022-11-11

## 2022-11-11 VITALS
DIASTOLIC BLOOD PRESSURE: 88 MMHG | OXYGEN SATURATION: 98 % | BODY MASS INDEX: 28 KG/M2 | HEIGHT: 71 IN | HEART RATE: 72 BPM | WEIGHT: 200 LBS | SYSTOLIC BLOOD PRESSURE: 132 MMHG | TEMPERATURE: 97.6 F

## 2022-11-11 VITALS — SYSTOLIC BLOOD PRESSURE: 110 MMHG | DIASTOLIC BLOOD PRESSURE: 80 MMHG

## 2022-11-11 DIAGNOSIS — R03.0 ELEVATED BLOOD-PRESSURE READING, W/OUT DIAGNOSIS OF HYPERTENSION: ICD-10-CM

## 2022-11-11 DIAGNOSIS — I25.10 ATHEROSCLEROTIC HEART DISEASE OF NATIVE CORONARY ARTERY W/OUT ANGINA PECTORIS: ICD-10-CM

## 2022-11-11 PROCEDURE — 99215 OFFICE O/P EST HI 40 MIN: CPT

## 2022-11-11 RX ORDER — ASPIRIN 325 MG/1
325 TABLET, FILM COATED ORAL
Refills: 0 | Status: COMPLETED | COMMUNITY
End: 2022-11-11

## 2022-11-11 RX ORDER — DIAZEPAM 5 MG/1
5 TABLET ORAL
Qty: 1 | Refills: 0 | Status: COMPLETED | COMMUNITY
Start: 2022-10-12 | End: 2022-11-11

## 2022-11-12 NOTE — ASSESSMENT
[FreeTextEntry1] : Pt. 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 RTC in 2 months to repeat lipid panel since he has been off Atorvastatin for 4 months\par - Advised pt. to re-start Atorvastatin 40 mg QHS - refill sent to pharmacy. Will hold off on repeat until he is on the med for ~2 months. \par - Coronary artery calcium (CAC) ordered for further risk stratification. If CAC score is elevated, would start ASA 81 mg QD. For now, no need to take ASA. Based on results, would also consider further ischemic workup with stress EKG\par - Encouraged patient to continue healthy exercise and eating habits, focusing on a Mediterranean style of eating and aiming for the recommended 150 minutes per week of moderate physical activity \par \par Elevated BP reading: \par - Elevated on initial reading, controlled on repeat \par - Evidence of mild LVH on last echo (06/2021)\par - Encouraged the patient to monitor blood pressure at home, keep a log, and report results back to us for evaluation. Based on results, would consider starting antihypertensive\par - Also advised patient to be mindful of sodium and alcohol intake, as well as any over-the-counter medications (such as NSAIDs or decongestants) that may increase blood pressure\par \par Pt. to RTC in 2 months for BP check and lipid profile.

## 2022-11-12 NOTE — REASON FOR VISIT
[CV Risk Factors and Non-Cardiac Disease] : CV risk factors and non-cardiac disease [Hyperlipidemia] : hyperlipidemia [FreeTextEntry1] : Pt. 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

## 2022-11-12 NOTE — ASSESSMENT
[FreeTextEntry1] : Pt. 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 RTC in 2 months to repeat lipid panel since he has been off Atorvastatin for 4 months\par - Advised pt. to re-start Atorvastatin 40 mg QHS - refill sent to pharmacy. Will hold off on repeat until he is on the med for ~2 months. \par - Coronary artery calcium (CAC) ordered for further risk stratification. If CAC score is elevated, would start ASA 81 mg QD. For now, no need to take ASA. Based on results, would also consider further ischemic workup with stress EKG\par - Encouraged patient to continue healthy exercise and eating habits, focusing on a Mediterranean style of eating and aiming for the recommended 150 minutes per week of moderate physical activity \par \par Elevated BP reading: \par - Elevated on initial reading, controlled on repeat \par - Evidence of mild LVH on last echo (06/2021)\par - Encouraged the patient to monitor blood pressure at home, keep a log, and report results back to us for evaluation. Based on results, would consider starting antihypertensive\par - Also advised patient to be mindful of sodium and alcohol intake, as well as any over-the-counter medications (such as NSAIDs or decongestants) that may increase blood pressure\par \par Pt. to RTC in 2 months for BP check and lipid profile.

## 2022-11-12 NOTE — REVIEW OF SYSTEMS
[Headache] : headache [Fever] : no fever [Chills] : no chills [SOB] : no shortness of breath [Dyspnea on exertion] : not dyspnea during exertion [Chest Discomfort] : no chest discomfort [Lower Ext Edema] : no extremity edema [Leg Claudication] : no intermittent leg claudication [Palpitations] : no palpitations [Orthopnea] : no orthopnea [PND] : no PND [Syncope] : no syncope [Cough] : no cough [de-identified] : Lightheadedness

## 2022-11-12 NOTE — REVIEW OF SYSTEMS
[Headache] : headache [Fever] : no fever [Chills] : no chills [SOB] : no shortness of breath [Dyspnea on exertion] : not dyspnea during exertion [Chest Discomfort] : no chest discomfort [Lower Ext Edema] : no extremity edema [Leg Claudication] : no intermittent leg claudication [Palpitations] : no palpitations [Orthopnea] : no orthopnea [PND] : no PND [Syncope] : no syncope [Cough] : no cough [de-identified] : Lightheadedness

## 2022-11-12 NOTE — REASON FOR VISIT
[CV Risk Factors and Non-Cardiac Disease] : CV risk factors and non-cardiac disease [Hyperlipidemia] : hyperlipidemia [FreeTextEntry1] : Pt. 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

## 2022-11-14 NOTE — HISTORY OF PRESENT ILLNESS
[FreeTextEntry1] : \par \par prior note 06/2021: 61 year old M with PMHx of heavy coronary calcification (seen on PET CT 3/2019), HLD, hx of head and neck cancer (s/p radiation) - now in remission, GERD who presents for follow up. \par \par Has been on and off with lipitor 40 mg, has been on in consistently for 6-8 weeks. Prior to that, he felt it made him sluggish, had joint aches. He is now taking it night, symptoms are much improved. He denies any exertional sx such as CP or SOB. He reports some LE edema last month which has now resolved. He is trying to eat plant based. Tore his achilles, has been more sedentary. \par  61 year old M with PMHx of heavy coronary calcification (seen on PETCT 3/2019), HLD, hx of head and neck cancer (s/p radiation) - now in remission, GERD who presents for follow up. \par \par Coronary calcifications (seen on PETCT 3/2019)\par - Continue lipitor 40 mg and aspirin 81 mg \par - Lipid profile 8/2020 TG 56, HDL 70, \par - Check lipid profile today, LDL goal < 70 mg dL\par - EKG without ischemic changes \par \par Elevated BP readings\par - Currently not on antihypertensive therapy \par - We have asked patient to keep a log of BP over the next week \par - He will send us his readings through portal\par - Obtain 2D echocardiogram \par .

## 2023-01-20 ENCOUNTER — APPOINTMENT (OUTPATIENT)
Dept: HEART AND VASCULAR | Facility: CLINIC | Age: 63
End: 2023-01-20
Payer: COMMERCIAL

## 2023-01-20 VITALS
SYSTOLIC BLOOD PRESSURE: 130 MMHG | HEART RATE: 77 BPM | WEIGHT: 220 LBS | DIASTOLIC BLOOD PRESSURE: 89 MMHG | BODY MASS INDEX: 30.8 KG/M2 | HEIGHT: 71 IN

## 2023-01-20 DIAGNOSIS — R06.00 DYSPNEA, UNSPECIFIED: ICD-10-CM

## 2023-01-20 DIAGNOSIS — E78.5 HYPERLIPIDEMIA, UNSPECIFIED: ICD-10-CM

## 2023-01-20 DIAGNOSIS — I70.0 ATHEROSCLEROSIS OF AORTA: ICD-10-CM

## 2023-01-20 PROCEDURE — 36415 COLL VENOUS BLD VENIPUNCTURE: CPT

## 2023-01-20 PROCEDURE — 99214 OFFICE O/P EST MOD 30 MIN: CPT | Mod: 25

## 2023-01-20 PROCEDURE — 93000 ELECTROCARDIOGRAM COMPLETE: CPT

## 2023-01-23 LAB
ANION GAP SERPL CALC-SCNC: 12 MMOL/L
BUN SERPL-MCNC: 11 MG/DL
CALCIUM SERPL-MCNC: 9.9 MG/DL
CHLORIDE SERPL-SCNC: 103 MMOL/L
CHOLEST SERPL-MCNC: 223 MG/DL
CO2 SERPL-SCNC: 24 MMOL/L
CREAT SERPL-MCNC: 0.96 MG/DL
EGFR: 90 ML/MIN/1.73M2
ESTIMATED AVERAGE GLUCOSE: 108 MG/DL
GLUCOSE SERPL-MCNC: 98 MG/DL
HBA1C MFR BLD HPLC: 5.4 %
HDLC SERPL-MCNC: 92 MG/DL
LDLC SERPL CALC-MCNC: 116 MG/DL
NONHDLC SERPL-MCNC: 131 MG/DL
POTASSIUM SERPL-SCNC: 4.7 MMOL/L
SODIUM SERPL-SCNC: 139 MMOL/L
TRIGL SERPL-MCNC: 76 MG/DL

## 2023-01-31 ENCOUNTER — NON-APPOINTMENT (OUTPATIENT)
Age: 63
End: 2023-01-31

## 2023-01-31 ENCOUNTER — TRANSCRIPTION ENCOUNTER (OUTPATIENT)
Age: 63
End: 2023-01-31

## 2023-01-31 ENCOUNTER — OUTPATIENT (OUTPATIENT)
Dept: OUTPATIENT SERVICES | Facility: HOSPITAL | Age: 63
LOS: 1 days | End: 2023-01-31

## 2023-01-31 ENCOUNTER — APPOINTMENT (OUTPATIENT)
Dept: CT IMAGING | Facility: CLINIC | Age: 63
End: 2023-01-31
Payer: SELF-PAY

## 2023-01-31 PROCEDURE — 75571 CT HRT W/O DYE W/CA TEST: CPT | Mod: 26

## 2023-02-01 ENCOUNTER — TRANSCRIPTION ENCOUNTER (OUTPATIENT)
Age: 63
End: 2023-02-01

## 2023-02-01 ENCOUNTER — NON-APPOINTMENT (OUTPATIENT)
Age: 63
End: 2023-02-01

## 2023-02-27 ENCOUNTER — APPOINTMENT (OUTPATIENT)
Dept: HEART AND VASCULAR | Facility: CLINIC | Age: 63
End: 2023-02-27
Payer: COMMERCIAL

## 2023-02-27 VITALS
BODY MASS INDEX: 30.8 KG/M2 | WEIGHT: 220 LBS | DIASTOLIC BLOOD PRESSURE: 100 MMHG | HEART RATE: 76 BPM | HEIGHT: 71 IN | SYSTOLIC BLOOD PRESSURE: 150 MMHG

## 2023-02-27 PROCEDURE — 93351 STRESS TTE COMPLETE: CPT

## 2023-04-03 NOTE — HISTORY OF PRESENT ILLNESS
Onset: last night    Location / description: Pt reports he has abd pain, vomiting repeatedly since last night, Glucose is 310, feels weak, on antibiotics for leg infection.     Precipitating Factors: as above    Pain Scale (1-10), 10 highest: 5/10    What  improves / worsens symptoms: Nothing    Symptom specific medications: see med list    Recent visits (last 3-4 weeks) for same reason or recent surgery:  None    PLAN:  See Provider/Urgent care in next 4 hours    Patient/Caller agrees to follow recommendations.    Reason for Disposition  • [1] MILD-MODERATE pain AND [2] constant AND [3] present > 2 hours    Protocols used: ABDOMINAL PAIN - MALE-A-AH      
Regardin m  food poisoning, and blood sugar 310 and increasing  ----- Message from Vicki Freddy sent at 4/3/2023 11:37 AM CDT -----  Patient Name: Sher Torres    Specialist or PCP Name: Trey Jc MD      Symptoms:  food poisoning, and blood sugar 310 and increasing   Pregnant (females aged 13-60. If Yes, how long?) : no    Call Back # :[phone]    Which State are you currently located in?: [place]    Name of Clinic Site / Acct# : [id number] [place] [address]    Use following scripting for patients waiting for a callback:   \"Nurse callback times vary based on call volumes; please be aware the return phone call may come from an unidentified phone number. If your symptoms worsen or become life threatening while waiting, you should seek immediate assistance by calling 911 or going to the ER for evaluation.\"    
[de-identified] : 60M with [id number] p16+ right tonsil SCC treated with definitive EBRT with 6996 cGy to the oropharynx/neck from 10/11/18 - 11/27/18. He tolerated treatment without unexpected complications. \par \par He ruptured his R Achilles tendon playing basketball s/p repair 2/26/20. Pt reports no trismus, good diet and swallowing, weight was stable until his recent injury. No coughing, no choking, no dyspnea. He continues to have mild xerostomia which can affect deeper pitches, which he alleviates by drinking liquids. He has altered taste, sometime has a salty/bitter aftertaste. Reports adequate fluid intake. He has right neck tightness, continues to perform home neck exercises. 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  \par \par 7/28/20 CT neck: No evidence of recurrent oropharyngeal mass or cervical lymphadenopathy.  However, a new subcm polypoid focus of soft tissue density is identified along the right anterior wall of the cervical trachea, approximately 3 cm below the level of the cords, This is indeterminate for a polyp or mass, or possibly dense mucoid debris although it lies anteriorly rather than posteriorly. Mild asymmetric fullness of the right vocal cord is also noted, nonspecific abut perhaps hypertropic or edematous post-RT and without suspicious enhancement.\par \par He is s/p DL and EUA on 8/20/20. Polypoid focus on the anterior wall of the trachea on CT imaging was an anatomic variant of the cricoid cartilage, there was no tumor to biopsy.  He is doing well, reports difficulty expectorating phlegm.  Has f/u appt with [facility] in October.\par \par His wife is recording the visit.\par \par

## 2023-09-19 ENCOUNTER — RX RENEWAL (OUTPATIENT)
Age: 63
End: 2023-09-19

## 2024-04-10 ENCOUNTER — TRANSCRIPTION ENCOUNTER (OUTPATIENT)
Age: 64
End: 2024-04-10

## 2024-06-05 ENCOUNTER — RX RENEWAL (OUTPATIENT)
Age: 64
End: 2024-06-05

## 2024-06-07 ENCOUNTER — RX RENEWAL (OUTPATIENT)
Age: 64
End: 2024-06-07

## 2024-06-07 RX ORDER — ATORVASTATIN CALCIUM 40 MG/1
40 TABLET, FILM COATED ORAL
Qty: 90 | Refills: 0 | Status: ACTIVE | COMMUNITY
Start: 2020-08-07 | End: 1900-01-01

## 2024-07-09 ENCOUNTER — APPOINTMENT (OUTPATIENT)
Dept: ORTHOPEDIC SURGERY | Facility: CLINIC | Age: 64
End: 2024-07-09
Payer: COMMERCIAL

## 2024-07-09 VITALS
OXYGEN SATURATION: 98 % | WEIGHT: 201 LBS | BODY MASS INDEX: 28.14 KG/M2 | SYSTOLIC BLOOD PRESSURE: 153 MMHG | HEART RATE: 63 BPM | HEIGHT: 71 IN | DIASTOLIC BLOOD PRESSURE: 114 MMHG

## 2024-07-09 DIAGNOSIS — M50.30 OTHER CERVICAL DISC DEGENERATION, UNSPECIFIED CERVICAL REGION: ICD-10-CM

## 2024-07-09 DIAGNOSIS — M54.12 RADICULOPATHY, CERVICAL REGION: ICD-10-CM

## 2024-07-09 PROCEDURE — 72050 X-RAY EXAM NECK SPINE 4/5VWS: CPT

## 2024-07-09 PROCEDURE — 99214 OFFICE O/P EST MOD 30 MIN: CPT

## 2024-07-21 ENCOUNTER — NON-APPOINTMENT (OUTPATIENT)
Age: 64
End: 2024-07-21

## 2024-07-22 ENCOUNTER — APPOINTMENT (OUTPATIENT)
Dept: HEART AND VASCULAR | Facility: CLINIC | Age: 64
End: 2024-07-22
Payer: COMMERCIAL

## 2024-07-22 VITALS — DIASTOLIC BLOOD PRESSURE: 84 MMHG | SYSTOLIC BLOOD PRESSURE: 146 MMHG

## 2024-07-22 VITALS
DIASTOLIC BLOOD PRESSURE: 99 MMHG | WEIGHT: 197 LBS | BODY MASS INDEX: 27.58 KG/M2 | HEART RATE: 70 BPM | SYSTOLIC BLOOD PRESSURE: 141 MMHG | OXYGEN SATURATION: 98 % | TEMPERATURE: 97.7 F | HEIGHT: 71 IN

## 2024-07-22 DIAGNOSIS — R03.0 ELEVATED BLOOD-PRESSURE READING, W/OUT DIAGNOSIS OF HYPERTENSION: ICD-10-CM

## 2024-07-22 DIAGNOSIS — I10 ESSENTIAL (PRIMARY) HYPERTENSION: ICD-10-CM

## 2024-07-22 DIAGNOSIS — I25.10 ATHEROSCLEROTIC HEART DISEASE OF NATIVE CORONARY ARTERY W/OUT ANGINA PECTORIS: ICD-10-CM

## 2024-07-22 DIAGNOSIS — R42 DIZZINESS AND GIDDINESS: ICD-10-CM

## 2024-07-22 DIAGNOSIS — R07.89 OTHER CHEST PAIN: ICD-10-CM

## 2024-07-22 DIAGNOSIS — E78.5 HYPERLIPIDEMIA, UNSPECIFIED: ICD-10-CM

## 2024-07-22 PROCEDURE — 99215 OFFICE O/P EST HI 40 MIN: CPT

## 2024-07-22 PROCEDURE — 93880 EXTRACRANIAL BILAT STUDY: CPT

## 2024-07-22 PROCEDURE — G2211 COMPLEX E/M VISIT ADD ON: CPT | Mod: NC

## 2024-07-22 PROCEDURE — 36415 COLL VENOUS BLD VENIPUNCTURE: CPT

## 2024-07-22 PROCEDURE — 93000 ELECTROCARDIOGRAM COMPLETE: CPT

## 2024-07-22 RX ORDER — AMLODIPINE BESYLATE 5 MG/1
5 TABLET ORAL
Qty: 90 | Refills: 3 | Status: ACTIVE | COMMUNITY
Start: 2024-07-22 | End: 1900-01-01

## 2024-07-22 NOTE — PHYSICAL EXAM
[Normal Conjunctiva] : normal conjunctiva [Normal Venous Pressure] : normal venous pressure [No Carotid Bruit] : no carotid bruit [Normal S1, S2] : normal S1, S2 [No Murmur] : no murmur [Normal Gait] : normal gait [Normal DP B/L] : normal DP B/L [Normal] : alert and oriented, normal memory

## 2024-07-22 NOTE — DISCUSSION/SUMMARY
[EKG obtained to assist in diagnosis and management of assessed problem(s)] : EKG obtained to assist in diagnosis and management of assessed problem(s) [FreeTextEntry1] : #Atypical chest pain Non-exertional, associated with eating and resolved with belching. Negative EST in Feb 2023. Given his plaque burden, will pursue further ischemic evaluation.  - CCTA   #Lightheadedness - carotid doppler  #Coronary artery calcification  #HLD #ASCVD risk reduction LDL at goal <70mg/dL. CAC 1005.  - Lp(a) today - start ASA 81mg daily - c/w atorvastatin 40mg daily - Encouraged patient to increase healthy exercise and eating habits, focusing on a Mediterranean style of eating and aiming for the recommended 150 minutes per week of moderate physical activity   #HTN  BP above goal in office and on home log. Evidence of mild LVH on last echo (06/2021) - start amlodipine 5mg daily - Encouraged pt. to work on lifestyle modification through weight loss, diet and exercise for improved BP control  RTC 2-4 weeks for telehealth with BITA Hummel

## 2024-07-22 NOTE — HISTORY OF PRESENT ILLNESS
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 Since his last visit, patient is feeling well overall. Does report some chest discomfort that he feels is related to gastritis and eating. Does not occur with exertion, relieved with belching. Checks BP at home and readings are 130s/80s-150s/110s. Has not been as active due to neck pain but he goes for long walks. Has been trying to eat healthy diet with vegetables, fish, whole grains.   He also denies any orthopnea, PND, palpitations, lightheadedness, syncope or lower extremity edema.   CARDIOMETABOLIC & DISEASE-MODIFYING RISK FACTORS:  =============================== RADIOLOGY/IMAGING/DIAGNOSTIC TESTING: 2023 - stress echo negative  -2023- Total: 1005, LM: 5, LAD: 841, LCx: 46, RCA: 113  -ECHO (2021): mild LVH, normal LVSF with EF 60-65%  -EKG (2021): NSR @ 80 bpm  LABS: 2024 lipids  HDL 50 LDL 57 TG 61; a1c 5.5% 2023 lipids - , HDL 92, , TG 76  -Lipid profile (2021): T, TC: 152, HDL: 54. LDL: 83  -A1c (2021): 5.4%  Home Readings: BP Reading Random 2x day. 7/10  12am 143/83          11pm 141/105     11:40pm 141/99    12am 141/91     10:40am 135/89    12pm 150/100. In LV       2:08pm.163/106        2:45pm 157/96 (after water and dark chocolate         12:44am 141/87     12:50pm 146/99        2:00am 141/89 3 test avg      7:10am 129/87    12:00pm 142/84      2:08pm 139/91  7/15      2:00am 155/110      9:19am 130/93   716      9:00am 137/82 Back in [place] after red eye flight.        12:30am 129/87 3 test avg.      10:20am 143/91        1:30pm 127/83 3 test avg.        8:25pm 148/101 3 test avg.           1:05pm 147/92 3 test avg.         4:10pm 136/88 3 test avg.        12:00pm 130/83 3 test avg.          2:30am 133/85 3 test avg.          1:56am 130/81 3 test avg.         4:40pm 118/79 3 test avg.       11:40pm 121/83 3 test avg.

## 2024-07-22 NOTE — END OF VISIT
[] : Fellow [Time Spent: ___ minutes] : I have spent [unfilled] minutes of time on the encounter. [FreeTextEntry3] : Patient seen and examined. Case discussed with preventive cardiology fellow. Agree with plan as detailed above.

## 2024-07-23 ENCOUNTER — NON-APPOINTMENT (OUTPATIENT)
Age: 64
End: 2024-07-23

## 2024-07-23 ENCOUNTER — TRANSCRIPTION ENCOUNTER (OUTPATIENT)
Age: 64
End: 2024-07-23

## 2024-07-23 LAB — APO LP(A) SERPL-MCNC: 90.8 NMOL/L

## 2024-07-25 ENCOUNTER — TRANSCRIPTION ENCOUNTER (OUTPATIENT)
Age: 64
End: 2024-07-25

## 2024-08-16 ENCOUNTER — RESULT REVIEW (OUTPATIENT)
Age: 64
End: 2024-08-16

## 2024-08-21 ENCOUNTER — RESULT REVIEW (OUTPATIENT)
Age: 64
End: 2024-08-21

## 2024-08-22 ENCOUNTER — TRANSCRIPTION ENCOUNTER (OUTPATIENT)
Age: 64
End: 2024-08-22

## 2024-12-09 ENCOUNTER — RX RENEWAL (OUTPATIENT)
Age: 64
End: 2024-12-09

## 2025-07-03 ENCOUNTER — RX RENEWAL (OUTPATIENT)
Age: 65
End: 2025-07-03

## 2025-07-03 RX ORDER — ASPIRIN 81 MG/1
81 TABLET, COATED ORAL
Qty: 90 | Refills: 3 | Status: ACTIVE | COMMUNITY
Start: 2025-07-03 | End: 1900-01-01